# Patient Record
Sex: FEMALE | Race: WHITE | NOT HISPANIC OR LATINO | Employment: UNEMPLOYED | URBAN - METROPOLITAN AREA
[De-identification: names, ages, dates, MRNs, and addresses within clinical notes are randomized per-mention and may not be internally consistent; named-entity substitution may affect disease eponyms.]

---

## 2022-12-02 LAB
ABO + RH BLD: NORMAL
C TRACH RRNA SPEC QL PROBE: NORMAL
HBV SURFACE AG SERPL QL IA: NEGATIVE
HIV 1+2 AB+HIV1 P24 AG SERPL QL IA: NORMAL
N GONORRHOEAE, AMPLIFIED DNA: NORMAL
RPR: NORMAL
RUBELLA IMMUNE STATUS: NORMAL

## 2023-02-07 ENCOUNTER — HOSPITAL ENCOUNTER (EMERGENCY)
Facility: HOSPITAL | Age: 33
Discharge: HOME | End: 2023-02-07
Attending: OBSTETRICS & GYNECOLOGY
Payer: MEDICAID

## 2023-02-07 VITALS
HEIGHT: 64 IN | WEIGHT: 198 LBS | BODY MASS INDEX: 33.8 KG/M2 | OXYGEN SATURATION: 98 % | SYSTOLIC BLOOD PRESSURE: 109 MMHG | RESPIRATION RATE: 18 BRPM | HEART RATE: 93 BPM | DIASTOLIC BLOOD PRESSURE: 73 MMHG

## 2023-02-07 PROBLEM — O99.891 BACK PAIN AFFECTING PREGNANCY IN THIRD TRIMESTER: Status: ACTIVE | Noted: 2023-02-07

## 2023-02-07 PROBLEM — M54.9 BACK PAIN AFFECTING PREGNANCY IN THIRD TRIMESTER: Status: ACTIVE | Noted: 2023-02-07

## 2023-02-07 LAB
AMPHET UR QL SCN: NEGATIVE
APPEARANCE UR: ABNORMAL
BACTERIA #/AREA URNS AUTO: ABNORMAL /HPF
BARBITURATE SCN PRESENT UR: NEGATIVE
BENZODIAZ UR QL SCN: NEGATIVE
BILIRUB UR QL STRIP.AUTO: NEGATIVE MG/DL
CANNABINOIDS UR QL SCN: NEGATIVE
COCAINE UR QL SCN: NEGATIVE
COLOR UR AUTO: YELLOW
FENTANYL UR QL SCN: NEGATIVE
GLUCOSE UR QL STRIP.AUTO: NEGATIVE MG/DL
KETONES UR QL STRIP.AUTO: NEGATIVE MG/DL
LEUKOCYTE ESTERASE UR QL STRIP.AUTO: ABNORMAL UNIT/L
MDMA UR QL SCN: NEGATIVE
NITRITE UR QL STRIP.AUTO: NEGATIVE
OPIATES UR QL SCN: NEGATIVE
PCP UR QL: NEGATIVE
PH UR STRIP.AUTO: 8 [PH]
PH UR: 8 [PH] (ref 3–11)
PROT UR QL STRIP.AUTO: NEGATIVE MG/DL
RBC #/AREA URNS AUTO: <5 /HPF
RBC UR QL AUTO: ABNORMAL UNIT/L
SP GR UR STRIP.AUTO: 1.01 (ref 1–1.03)
SQUAMOUS #/AREA URNS AUTO: <5 /HPF
UROBILINOGEN UR STRIP-ACNC: 1 MG/DL
WBC #/AREA URNS AUTO: 43 /HPF

## 2023-02-07 PROCEDURE — 63700000 PHARM REV CODE 250 ALT 637 W/O HCPCS: Performed by: OBSTETRICS & GYNECOLOGY

## 2023-02-07 PROCEDURE — 81001 URINALYSIS AUTO W/SCOPE: CPT | Mod: 59 | Performed by: OBSTETRICS & GYNECOLOGY

## 2023-02-07 PROCEDURE — 25000003 PHARM REV CODE 250: Performed by: OBSTETRICS & GYNECOLOGY

## 2023-02-07 PROCEDURE — 63600175 PHARM REV CODE 636 W HCPCS: Performed by: OBSTETRICS & GYNECOLOGY

## 2023-02-07 PROCEDURE — 96360 HYDRATION IV INFUSION INIT: CPT

## 2023-02-07 PROCEDURE — 80307 DRUG TEST PRSMV CHEM ANLYZR: CPT | Performed by: OBSTETRICS & GYNECOLOGY

## 2023-02-07 PROCEDURE — 99284 EMERGENCY DEPT VISIT MOD MDM: CPT | Mod: 25

## 2023-02-07 PROCEDURE — 87088 URINE BACTERIA CULTURE: CPT | Performed by: OBSTETRICS & GYNECOLOGY

## 2023-02-07 RX ORDER — PROGESTERONE 100 MG/1
100 CAPSULE ORAL DAILY
Status: ON HOLD | COMMUNITY
End: 2023-03-05 | Stop reason: HOSPADM

## 2023-02-07 RX ORDER — ACETAMINOPHEN 325 MG/1
650 TABLET ORAL EVERY 6 HOURS PRN
Status: DISCONTINUED | OUTPATIENT
Start: 2023-02-07 | End: 2023-02-08 | Stop reason: HOSPADM

## 2023-02-07 RX ORDER — ACETAMINOPHEN 325 MG/1
650 TABLET ORAL
Status: COMPLETED | OUTPATIENT
Start: 2023-02-07 | End: 2023-02-07

## 2023-02-07 RX ADMIN — SODIUM CHLORIDE, POTASSIUM CHLORIDE, SODIUM LACTATE AND CALCIUM CHLORIDE 1000 ML: 600; 310; 30; 20 INJECTION, SOLUTION INTRAVENOUS at 09:02

## 2023-02-07 RX ADMIN — ACETAMINOPHEN 650 MG: 325 TABLET, FILM COATED ORAL at 10:02

## 2023-02-07 RX ADMIN — FLUCONAZOLE 150 MG: 50 TABLET ORAL at 10:02

## 2023-02-08 NOTE — ED PROVIDER NOTES
"       AUDI NOTE  [facility]      Admit Date: 2023  AUDI Physician: Kristyn Cristina  Primary OBGYN: Dr. Montalvo    Admit Diagnosis/Chief Complaint: Back Pain  Discharge Diagnosis:  yeast vaginiitis, dehydration    Chief Complaint   Patient presents with    Back Pain       Hospital Course:  Tori Bojorquez is a 32 y.o.  at 35w2d presents complaining of back pain.  This IUP is complicated by hx cerclage says was placed early 2nd tri by outside facility vaginally. Follows with Karan and Dr. Sanchez.  Denies fever, discharge and is having some dysuria. Denies hx flank pain.  Patient denies vaginal bleeding, leakage of fluid, contractions, headache, vision changes, RUQ pain, fever, and nausea/vomiting.  Fetal Movement: normal.    Ht 5' 4" (1.626 m)   Wt 89.8 kg (198 lb)   Breastfeeding No   BMI 33.99 kg/m²        General: in no apparent distress in no respiratory distress and acyanotic oriented times 3 afebrile normal vitals  Cardiovascular: regular rate and rhythm no murmurs  Respiratory: unlabored  Abdominal: soft, nontender, nondistended, no abnormal masses, no epigastric pain FHT present  Back: lumbar tenderness absent CVA tenderness none suprapubic tenderness absent  Extremeties no redness or tenderness in the calves or thighs no edema    SVE (PeriWATCH)  Dilation (cm): 0  Examined by:: Jonnie ALEXIS  Chaperone in room: [facility]  Vaginal Exam Comments: SSE closed with cerclage in place, thick long cervix, copious thick cottage cheese discharge noted, neg pooling or valsalva         EFM: Cat 1, 145 modBTV, +accel, no decel (reassuring, reactive)  TOCO: irritability      Medical Decision Making:    LABS:   No results found for this or any previous visit (from the past 24 hour(s)).    Imaging Results    None          ASSESMENT and clinical impression: Tori Bojorquez is a 32 y.o.   at 35w2d with yeast vaginitis, dehydration    Discharge Diagnosis/clinical impression: "   Patient Active Problem List   Diagnosis    Back pain affecting pregnancy in third trimester       Status:Improved    Disposition:  discharged to home    Medications:   Diflucan, IV fluids    Patient Instructions:   - Pt was given routine pregnancy instructions including to return to triage if she had any vaginal bleeding (other than spotting for the next 48hrs), any loss of fluid like her water broke, decreased fetal movement, or contractions Q 5min lasting for 2 or more hours. Pt was also instructed to drink copious water. Patient voiced understanding of all these instructions and was subsequently discharged home. Tylenol use and maternity belt use discussed. All questions answered. Pt left AUDI with good understanding of plan.   Preeclampsia/ROM/labor/fever/decreased FM with [facility] precautions discussed, voiced understanding     She will follow up with her primary OB as scheduled    Kristyn Cristina MD  OB/GYN Hospitalist  8:41 PM 02/07/2023

## 2023-02-09 LAB — BACTERIA UR CULT: NO GROWTH

## 2023-03-02 ENCOUNTER — ANESTHESIA EVENT (OUTPATIENT)
Dept: OBSTETRICS AND GYNECOLOGY | Facility: HOSPITAL | Age: 33
End: 2023-03-02
Payer: MEDICAID

## 2023-03-02 ENCOUNTER — HOSPITAL ENCOUNTER (INPATIENT)
Facility: HOSPITAL | Age: 33
LOS: 3 days | Discharge: HOME | End: 2023-03-05
Attending: OBSTETRICS & GYNECOLOGY | Admitting: OBSTETRICS & GYNECOLOGY
Payer: MEDICAID

## 2023-03-02 DIAGNOSIS — Z34.90 PREGNANCY: ICD-10-CM

## 2023-03-02 DIAGNOSIS — Z3A.38 38 WEEKS GESTATION OF PREGNANCY: Primary | ICD-10-CM

## 2023-03-02 DIAGNOSIS — O41.00X0 OLIGOHYDRAMNIOS: ICD-10-CM

## 2023-03-02 LAB
BASOPHILS # BLD AUTO: 0.02 X10(3)/MCL (ref 0–0.2)
BASOPHILS NFR BLD AUTO: 0.2 %
EOSINOPHIL # BLD AUTO: 0.11 X10(3)/MCL (ref 0–0.9)
EOSINOPHIL NFR BLD AUTO: 0.9 %
ERYTHROCYTE [DISTWIDTH] IN BLOOD BY AUTOMATED COUNT: 12.8 % (ref 11.5–17)
GROUP & RH: NORMAL
HCT VFR BLD AUTO: 39.3 % (ref 37–47)
HGB BLD-MCNC: 13 G/DL (ref 12–16)
IMM GRANULOCYTES # BLD AUTO: 0.17 X10(3)/MCL (ref 0–0.04)
IMM GRANULOCYTES NFR BLD AUTO: 1.4 %
INDIRECT COOMBS GEL: NORMAL
LYMPHOCYTES # BLD AUTO: 2.83 X10(3)/MCL (ref 0.6–4.6)
LYMPHOCYTES NFR BLD AUTO: 23.2 %
MCH RBC QN AUTO: 29.5 PG
MCHC RBC AUTO-ENTMCNC: 33.1 G/DL (ref 33–36)
MCV RBC AUTO: 89.3 FL (ref 80–94)
MONOCYTES # BLD AUTO: 0.73 X10(3)/MCL (ref 0.1–1.3)
MONOCYTES NFR BLD AUTO: 6 %
NEUTROPHILS # BLD AUTO: 8.34 X10(3)/MCL (ref 2.1–9.2)
NEUTROPHILS NFR BLD AUTO: 68.3 %
NRBC BLD AUTO-RTO: 0 %
PLATELET # BLD AUTO: 296 X10(3)/MCL (ref 130–400)
PMV BLD AUTO: 9.2 FL (ref 7.4–10.4)
PRENATAL STREP B CULTURE: NORMAL
RBC # BLD AUTO: 4.4 X10(6)/MCL (ref 4.2–5.4)
T PALLIDUM AB SER QL: NONREACTIVE
WBC # SPEC AUTO: 12.2 X10(3)/MCL (ref 4.5–11.5)

## 2023-03-02 PROCEDURE — 11000001 HC ACUTE MED/SURG PRIVATE ROOM

## 2023-03-02 PROCEDURE — 85025 COMPLETE CBC W/AUTO DIFF WBC: CPT | Performed by: OBSTETRICS & GYNECOLOGY

## 2023-03-02 PROCEDURE — 86900 BLOOD TYPING SEROLOGIC ABO: CPT | Performed by: OBSTETRICS & GYNECOLOGY

## 2023-03-02 PROCEDURE — 86780 TREPONEMA PALLIDUM: CPT | Performed by: OBSTETRICS & GYNECOLOGY

## 2023-03-02 RX ORDER — SODIUM CHLORIDE, SODIUM LACTATE, POTASSIUM CHLORIDE, CALCIUM CHLORIDE 600; 310; 30; 20 MG/100ML; MG/100ML; MG/100ML; MG/100ML
INJECTION, SOLUTION INTRAVENOUS CONTINUOUS
Status: DISCONTINUED | OUTPATIENT
Start: 2023-03-02 | End: 2023-03-05 | Stop reason: HOSPADM

## 2023-03-03 ENCOUNTER — ANESTHESIA (OUTPATIENT)
Dept: OBSTETRICS AND GYNECOLOGY | Facility: HOSPITAL | Age: 33
End: 2023-03-03
Payer: MEDICAID

## 2023-03-03 PROBLEM — Z3A.38 38 WEEKS GESTATION OF PREGNANCY: Status: ACTIVE | Noted: 2023-03-03

## 2023-03-03 PROCEDURE — 37000009 HC ANESTHESIA EA ADD 15 MINS: Performed by: OBSTETRICS & GYNECOLOGY

## 2023-03-03 PROCEDURE — 36004725 HC OB OR TIME LEV III - EA ADD 15 MIN: Performed by: OBSTETRICS & GYNECOLOGY

## 2023-03-03 PROCEDURE — 37000008 HC ANESTHESIA 1ST 15 MINUTES: Performed by: OBSTETRICS & GYNECOLOGY

## 2023-03-03 PROCEDURE — 63600175 PHARM REV CODE 636 W HCPCS

## 2023-03-03 PROCEDURE — 51702 INSERT TEMP BLADDER CATH: CPT

## 2023-03-03 PROCEDURE — 36004724 HC OB OR TIME LEV III - 1ST 15 MIN: Performed by: OBSTETRICS & GYNECOLOGY

## 2023-03-03 PROCEDURE — 11000001 HC ACUTE MED/SURG PRIVATE ROOM

## 2023-03-03 PROCEDURE — 88300 SURGICAL PATH GROSS: CPT

## 2023-03-03 PROCEDURE — 71000033 HC RECOVERY, INTIAL HOUR: Performed by: OBSTETRICS & GYNECOLOGY

## 2023-03-03 PROCEDURE — 63600175 PHARM REV CODE 636 W HCPCS: Performed by: OBSTETRICS & GYNECOLOGY

## 2023-03-03 PROCEDURE — 25000003 PHARM REV CODE 250

## 2023-03-03 PROCEDURE — 27000492 HC SLEEVE, SCD T/L

## 2023-03-03 PROCEDURE — 25000003 PHARM REV CODE 250: Performed by: ANESTHESIOLOGY

## 2023-03-03 PROCEDURE — C1765 ADHESION BARRIER: HCPCS | Performed by: OBSTETRICS & GYNECOLOGY

## 2023-03-03 DEVICE — BARRIER SEPRAFILM 4-SECTION: Type: IMPLANTABLE DEVICE | Site: ABDOMEN | Status: FUNCTIONAL

## 2023-03-03 RX ORDER — MISOPROSTOL 100 UG/1
800 TABLET ORAL ONCE AS NEEDED
Status: DISCONTINUED | OUTPATIENT
Start: 2023-03-03 | End: 2023-03-05 | Stop reason: HOSPADM

## 2023-03-03 RX ORDER — BUPIVACAINE HYDROCHLORIDE 7.5 MG/ML
INJECTION, SOLUTION EPIDURAL; RETROBULBAR
Status: COMPLETED | OUTPATIENT
Start: 2023-03-03 | End: 2023-03-03

## 2023-03-03 RX ORDER — OXYCODONE AND ACETAMINOPHEN 10; 325 MG/1; MG/1
1 TABLET ORAL EVERY 4 HOURS PRN
Status: DISCONTINUED | OUTPATIENT
Start: 2023-03-03 | End: 2023-03-05 | Stop reason: HOSPADM

## 2023-03-03 RX ORDER — DOCUSATE SODIUM 100 MG/1
200 CAPSULE, LIQUID FILLED ORAL 2 TIMES DAILY
Status: DISCONTINUED | OUTPATIENT
Start: 2023-03-03 | End: 2023-03-05 | Stop reason: HOSPADM

## 2023-03-03 RX ORDER — SODIUM CITRATE AND CITRIC ACID MONOHYDRATE 334; 500 MG/5ML; MG/5ML
SOLUTION ORAL
Status: DISPENSED
Start: 2023-03-03 | End: 2023-03-03

## 2023-03-03 RX ORDER — BISACODYL 10 MG
10 SUPPOSITORY, RECTAL RECTAL ONCE AS NEEDED
Status: DISCONTINUED | OUTPATIENT
Start: 2023-03-03 | End: 2023-03-05 | Stop reason: HOSPADM

## 2023-03-03 RX ORDER — MUPIROCIN 20 MG/G
OINTMENT TOPICAL 2 TIMES DAILY
Status: DISCONTINUED | OUTPATIENT
Start: 2023-03-03 | End: 2023-03-05 | Stop reason: HOSPADM

## 2023-03-03 RX ORDER — CARBOPROST TROMETHAMINE 250 UG/ML
250 INJECTION, SOLUTION INTRAMUSCULAR
Status: DISCONTINUED | OUTPATIENT
Start: 2023-03-03 | End: 2023-03-05 | Stop reason: HOSPADM

## 2023-03-03 RX ORDER — ONDANSETRON 2 MG/ML
4 INJECTION INTRAMUSCULAR; INTRAVENOUS EVERY 6 HOURS PRN
Status: ACTIVE | OUTPATIENT
Start: 2023-03-03 | End: 2023-03-04

## 2023-03-03 RX ORDER — KETOROLAC TROMETHAMINE 30 MG/ML
INJECTION, SOLUTION INTRAMUSCULAR; INTRAVENOUS
Status: DISCONTINUED | OUTPATIENT
Start: 2023-03-03 | End: 2023-03-03

## 2023-03-03 RX ORDER — PROCHLORPERAZINE EDISYLATE 5 MG/ML
5 INJECTION INTRAMUSCULAR; INTRAVENOUS EVERY 6 HOURS PRN
Status: DISCONTINUED | OUTPATIENT
Start: 2023-03-03 | End: 2023-03-03

## 2023-03-03 RX ORDER — PRENATAL WITH FERROUS FUM AND FOLIC ACID 3080; 920; 120; 400; 22; 1.84; 3; 20; 10; 1; 12; 200; 27; 25; 2 [IU]/1; [IU]/1; MG/1; [IU]/1; MG/1; MG/1; MG/1; MG/1; MG/1; MG/1; UG/1; MG/1; MG/1; MG/1; MG/1
1 TABLET ORAL DAILY
Status: DISCONTINUED | OUTPATIENT
Start: 2023-03-03 | End: 2023-03-05 | Stop reason: HOSPADM

## 2023-03-03 RX ORDER — MORPHINE SULFATE 4 MG/ML
4 INJECTION, SOLUTION INTRAMUSCULAR; INTRAVENOUS EVERY 4 HOURS PRN
Status: DISCONTINUED | OUTPATIENT
Start: 2023-03-03 | End: 2023-03-05 | Stop reason: HOSPADM

## 2023-03-03 RX ORDER — FENTANYL CITRATE 50 UG/ML
INJECTION, SOLUTION INTRAMUSCULAR; INTRAVENOUS
Status: DISCONTINUED | OUTPATIENT
Start: 2023-03-03 | End: 2023-03-03

## 2023-03-03 RX ORDER — SODIUM CHLORIDE 0.9 % (FLUSH) 0.9 %
10 SYRINGE (ML) INJECTION
Status: DISCONTINUED | OUTPATIENT
Start: 2023-03-03 | End: 2023-03-05 | Stop reason: HOSPADM

## 2023-03-03 RX ORDER — OXYTOCIN/RINGER'S LACTATE 30/500 ML
95 PLASTIC BAG, INJECTION (ML) INTRAVENOUS ONCE
Status: COMPLETED | OUTPATIENT
Start: 2023-03-03 | End: 2023-03-03

## 2023-03-03 RX ORDER — ACETAMINOPHEN 325 MG/1
650 TABLET ORAL EVERY 6 HOURS
Status: ACTIVE | OUTPATIENT
Start: 2023-03-03 | End: 2023-03-04

## 2023-03-03 RX ORDER — ONDANSETRON 4 MG/1
8 TABLET, ORALLY DISINTEGRATING ORAL EVERY 8 HOURS PRN
Status: DISCONTINUED | OUTPATIENT
Start: 2023-03-03 | End: 2023-03-05 | Stop reason: HOSPADM

## 2023-03-03 RX ORDER — DIPHENHYDRAMINE HCL 25 MG
25 CAPSULE ORAL EVERY 4 HOURS PRN
Status: DISCONTINUED | OUTPATIENT
Start: 2023-03-03 | End: 2023-03-05 | Stop reason: HOSPADM

## 2023-03-03 RX ORDER — OXYTOCIN 10 [USP'U]/ML
10 INJECTION, SOLUTION INTRAMUSCULAR; INTRAVENOUS ONCE AS NEEDED
Status: DISCONTINUED | OUTPATIENT
Start: 2023-03-03 | End: 2023-03-05 | Stop reason: HOSPADM

## 2023-03-03 RX ORDER — METHYLERGONOVINE MALEATE 0.2 MG/ML
INJECTION INTRAVENOUS
Status: DISCONTINUED | OUTPATIENT
Start: 2023-03-03 | End: 2023-03-03

## 2023-03-03 RX ORDER — METHYLERGONOVINE MALEATE 0.2 MG/ML
200 INJECTION INTRAVENOUS
Status: DISCONTINUED | OUTPATIENT
Start: 2023-03-03 | End: 2023-03-05 | Stop reason: HOSPADM

## 2023-03-03 RX ORDER — OXYTOCIN 10 [USP'U]/ML
INJECTION, SOLUTION INTRAMUSCULAR; INTRAVENOUS
Status: DISCONTINUED | OUTPATIENT
Start: 2023-03-03 | End: 2023-03-03

## 2023-03-03 RX ORDER — SIMETHICONE 80 MG
1 TABLET,CHEWABLE ORAL EVERY 6 HOURS PRN
Status: DISCONTINUED | OUTPATIENT
Start: 2023-03-03 | End: 2023-03-05 | Stop reason: HOSPADM

## 2023-03-03 RX ORDER — PROCHLORPERAZINE EDISYLATE 5 MG/ML
5 INJECTION INTRAMUSCULAR; INTRAVENOUS EVERY 6 HOURS PRN
Status: DISCONTINUED | OUTPATIENT
Start: 2023-03-03 | End: 2023-03-05 | Stop reason: HOSPADM

## 2023-03-03 RX ORDER — CEFAZOLIN SODIUM 2 G/50ML
2 SOLUTION INTRAVENOUS
Status: DISCONTINUED | OUTPATIENT
Start: 2023-03-03 | End: 2023-03-04

## 2023-03-03 RX ORDER — OXYCODONE HYDROCHLORIDE 5 MG/1
5 TABLET ORAL EVERY 4 HOURS PRN
Status: DISPENSED | OUTPATIENT
Start: 2023-03-03 | End: 2023-03-04

## 2023-03-03 RX ORDER — MORPHINE SULFATE 1 MG/ML
INJECTION, SOLUTION EPIDURAL; INTRATHECAL; INTRAVENOUS
Status: DISCONTINUED | OUTPATIENT
Start: 2023-03-03 | End: 2023-03-03

## 2023-03-03 RX ORDER — AMOXICILLIN 250 MG
1 CAPSULE ORAL NIGHTLY PRN
Status: DISCONTINUED | OUTPATIENT
Start: 2023-03-03 | End: 2023-03-05 | Stop reason: HOSPADM

## 2023-03-03 RX ORDER — OXYTOCIN/RINGER'S LACTATE 30/500 ML
334 PLASTIC BAG, INJECTION (ML) INTRAVENOUS ONCE AS NEEDED
Status: DISCONTINUED | OUTPATIENT
Start: 2023-03-03 | End: 2023-03-05 | Stop reason: HOSPADM

## 2023-03-03 RX ORDER — MORPHINE SULFATE 4 MG/ML
2 INJECTION, SOLUTION INTRAMUSCULAR; INTRAVENOUS EVERY 5 MIN PRN
Status: DISCONTINUED | OUTPATIENT
Start: 2023-03-04 | End: 2023-03-05 | Stop reason: HOSPADM

## 2023-03-03 RX ORDER — OXYCODONE HYDROCHLORIDE 5 MG/1
10 TABLET ORAL EVERY 4 HOURS PRN
Status: DISCONTINUED | OUTPATIENT
Start: 2023-03-03 | End: 2023-03-03

## 2023-03-03 RX ORDER — ONDANSETRON 2 MG/ML
INJECTION INTRAMUSCULAR; INTRAVENOUS
Status: DISCONTINUED | OUTPATIENT
Start: 2023-03-03 | End: 2023-03-03

## 2023-03-03 RX ORDER — SODIUM CITRATE AND CITRIC ACID MONOHYDRATE 334; 500 MG/5ML; MG/5ML
30 SOLUTION ORAL ONCE
Status: DISCONTINUED | OUTPATIENT
Start: 2023-03-03 | End: 2023-03-05 | Stop reason: HOSPADM

## 2023-03-03 RX ORDER — EPHEDRINE SULFATE 50 MG/ML
INJECTION, SOLUTION INTRAVENOUS
Status: DISCONTINUED | OUTPATIENT
Start: 2023-03-03 | End: 2023-03-03

## 2023-03-03 RX ORDER — ACETAMINOPHEN 10 MG/ML
INJECTION, SOLUTION INTRAVENOUS
Status: DISCONTINUED | OUTPATIENT
Start: 2023-03-03 | End: 2023-03-03

## 2023-03-03 RX ORDER — OXYTOCIN/RINGER'S LACTATE 30/500 ML
95 PLASTIC BAG, INJECTION (ML) INTRAVENOUS ONCE AS NEEDED
Status: DISCONTINUED | OUTPATIENT
Start: 2023-03-03 | End: 2023-03-05 | Stop reason: HOSPADM

## 2023-03-03 RX ORDER — ADHESIVE BANDAGE
30 BANDAGE TOPICAL 2 TIMES DAILY PRN
Status: DISCONTINUED | OUTPATIENT
Start: 2023-03-04 | End: 2023-03-05 | Stop reason: HOSPADM

## 2023-03-03 RX ADMIN — METHYLERGONOVINE MALEATE 200 MCG: 0.2 INJECTION, SOLUTION INTRAMUSCULAR; INTRAVENOUS at 09:03

## 2023-03-03 RX ADMIN — MORPHINE SULFATE 4 MG: 4 INJECTION INTRAVENOUS at 03:03

## 2023-03-03 RX ADMIN — CEFAZOLIN SODIUM 2 G: 2 SOLUTION INTRAVENOUS at 03:03

## 2023-03-03 RX ADMIN — BUPIVACAINE HYDROCHLORIDE 1.5 ML: 7.5 INJECTION, SOLUTION EPIDURAL; RETROBULBAR at 08:03

## 2023-03-03 RX ADMIN — PHENYLEPHRINE HYDROCHLORIDE 0.02 MCG/KG/MIN: 10 INJECTION INTRAVENOUS at 08:03

## 2023-03-03 RX ADMIN — SODIUM CHLORIDE, POTASSIUM CHLORIDE, SODIUM LACTATE AND CALCIUM CHLORIDE: 600; 310; 30; 20 INJECTION, SOLUTION INTRAVENOUS at 08:03

## 2023-03-03 RX ADMIN — OXYTOCIN 30 UNITS: 10 INJECTION, SOLUTION INTRAMUSCULAR; INTRAVENOUS at 09:03

## 2023-03-03 RX ADMIN — FENTANYL CITRATE 10 MCG: 50 INJECTION, SOLUTION INTRAMUSCULAR; INTRAVENOUS at 08:03

## 2023-03-03 RX ADMIN — ONDANSETRON 4 MG: 2 INJECTION INTRAMUSCULAR; INTRAVENOUS at 08:03

## 2023-03-03 RX ADMIN — Medication 95 MILLI-UNITS/MIN: at 10:03

## 2023-03-03 RX ADMIN — EPHEDRINE SULFATE 50 MG: 50 INJECTION INTRAVENOUS at 09:03

## 2023-03-03 RX ADMIN — MORPHINE SULFATE 0.1 MG: 1 INJECTION, SOLUTION EPIDURAL; INTRATHECAL; INTRAVENOUS at 08:03

## 2023-03-03 RX ADMIN — MORPHINE SULFATE 4 MG: 4 INJECTION INTRAVENOUS at 11:03

## 2023-03-03 RX ADMIN — KETOROLAC TROMETHAMINE 30 MG: 30 INJECTION, SOLUTION INTRAMUSCULAR; INTRAVENOUS at 09:03

## 2023-03-03 RX ADMIN — ACETAMINOPHEN 1000 MG: 10 INJECTION, SOLUTION INTRAVENOUS at 09:03

## 2023-03-03 NOTE — ANESTHESIA PREPROCEDURE EVALUATION
2023  Tori Bojorquez is a 32 y.o., female.      Tori Bojorquez    Pre-op Diagnosis: Oligohydramnios [O41.00X0]    Procedure(s):  REMOVAL, CERCLAGE SUTURE   SECTION     Review of patient's allergies indicates:   Allergen Reactions    Bactrim [sulfamethoxazole-trimethoprim]        Current Outpatient Medications   Medication Instructions    progesterone (PROMETRIUM) 100 mg, Oral, Daily     PMH negative    Past Surgical History:   Procedure Laterality Date     SECTION      CHOLECYSTECTOMY      TONSILLECTOMY       Lab Results   Component Value Date    WBC 12.2 (H) 2023    HGB 13.0 2023    HCT 39.3 2023    MCV 89.3 2023     2023          Pre-op Assessment    I have reviewed the Patient Summary Reports.     I have reviewed the Nursing Notes. I have reviewed the NPO Status.   I have reviewed the Medications.     Review of Systems  Anesthesia Hx:  No problems with previous Anesthesia  Denies Family Hx of Anesthesia complications.   Denies Personal Hx of Anesthesia complications.   Social:  Former Smoker    EENT/Dental:   Ears General/Symptom(s) Ear Symptoms:  Denies Throat Symptoms    Cardiovascular:   Exercise tolerance: good  Denies Angina.  Denies Orthopnea.  Denies PND.  Denies AGUIAR.  Functional Capacity good / => 4 METS    Musculoskeletal:  Musculoskeletal Normal    OB/GYN/PEDS:  Legal Guardian is Parents , birth was Full Term Denies Problems Associated with Premature Birth Denies Developmental Delay Denies Anomilies    Neurological:   Denies TIA. Denies CVA.    Psych:  Psychiatric Normal           Physical Exam  General: Well nourished, Alert and Oriented    Airway:  Mallampati: III   Mouth Opening: Normal  TM Distance: Normal  Tongue: Normal  Neck ROM: Normal ROM    Dental:  Intact    Chest/Lungs:  Clear to auscultation    Heart:  Rate:  Normal  Rhythm: Regular Rhythm  No pretibial edema  No carotid bruits      Anesthesia Plan  Type of Anesthesia, risks & benefits discussed:    Anesthesia Type: Spinal  Intra-op Monitoring Plan: Standard ASA Monitors  Post Op Pain Control Plan: multimodal analgesia  Induction:  IV  Airway Plan: Direct  Informed Consent: Informed consent signed with the Patient and all parties understand the risks and agree with anesthesia plan.  All questions answered. Patient consented to blood products? Yes  ASA Score: 2  Day of Surgery Review of History & Physical: H&P Update referred to the surgeon/provider.  Anesthesia Plan Notes: Risks post spinal headache, backache, high spinal, sensory / motor neuropathy, & failed spinal with possible General Anesthesia (GETA) explained & patient accepts     Ready For Surgery From Anesthesia Perspective.     .

## 2023-03-03 NOTE — PROCEDURE NOTE ADDENDUM
BIOPHYSICAL PROFILE AND TRANSVAGINAL OBSTETRICAL ULTRASOUND  REPORT    DATE OF ULTRASOUND: 2023     INDICATION: abdominal pain, cervical incompetence     Gestational Age: 38w4d     Movement: 2/2  Tone:  2/2  Breathin/2  Fluid:  2/2 PLACIDO 3.0, with 2 cm pocket.     FHR:  135,152,153 bpm during ultrasound    PLACIDO: 3.0    Presentation: Cephalic    Transvaginal ultrasound done could not retrieve adequate images to assess cervical length, because of patient discomfort and requesting to remove vaginal proble      Impression:  1.Biophysical profile  with oligohydramnios.     2. Uninterpretable images on transvaginal ultrasound.     With PLACIDO of 3 at 38.5 weeks, discussed findings with Dr Ervin and recommended delivery. Cerclage could be removed under anesthesia, after cs.         Sanket Russo MD       Components of this note were documented using voice recognition systems; and are subject to errors not corrected at proof reading. Please contact author for any clarifications.

## 2023-03-03 NOTE — ANESTHESIA PROCEDURE NOTES
Spinal    Diagnosis: Previous C Section  Patient location during procedure: OR  Start time: 3/3/2023 8:50 AM  Timeout: 3/3/2023 8:50 AM  End time: 3/3/2023 8:55 AM    Staffing  Authorizing Provider: Saman Velasquez MD  Performing Provider: Saman Velasquez MD    Preanesthetic Checklist  Completed: patient identified, IV checked, site marked, risks and benefits discussed, surgical consent, monitors and equipment checked, pre-op evaluation and timeout performed  Spinal Block  Patient position: sitting  Prep: ChloraPrep  Patient monitoring: heart rate, cardiac monitor, continuous pulse ox and frequent blood pressure checks  Approach: midline  Location: L4-5  Injection technique: single shot  CSF Fluid: clear free-flowing CSF  Needle  Needle type: Oscar   Needle gauge: 25 G  Needle length: 3.5 in  Additional Documentation: incremental injection, negative aspiration for heme and no paresthesia on injection  Needle localization: anatomical landmarks  Assessment  Sensory level: T5   Dermatomal levels determined by alcohol wipe  Ease of block: moderate  Additional Notes  Sitting position, usual prep & drape, local to skin & subq 25 ga 1% xylocaine (3 ml)  Introducer in, 25 ga Pencan needle, some discomfort when advancing through ligaments, clear csf on 3rd pass, transient paresthesia R leg < 2 seconds, dosed, 1.5 ml 0.75% Bupivacaine (in 8.25% dextrose), 10 mcg Fentanyl & 100 mcg Duramorph intrathecal, needle out, to supine with CALOS   Medications:    Medications: bupivacaine (pf) (MARCAINE) injection 0.75% - Intraspinal   1.5 mL - 3/3/2023 8:55:00 AM

## 2023-03-03 NOTE — H&P
[facility]                       [address] [id number]    PATIENT NAME:       RAF BOJORQUEZ   YOB: 1990  CSN:                [id number]   MRN:                [id number]  ADMIT DATE:         2023 17:00:00  PHYSICIAN:          Nader Sanchez MD                        HISTORY AND PHYSICAL      Ms. Bojorquez is a 32-year-old female,  3, para 2.  Last menstrual period is   uncertain.  EDC is 2023, estimated gestational age is 38 weeks.  She   presents for repeat  at 38 weeks because of oligohydramnios at the   direction of Maternal Fetal Medicine physician, Dr. Russo.  She also presents   for cerclage removal post .    PAST MEDICAL HISTORY:  Unremarkable.    PAST SURGICAL HISTORY:  She has had  section x2, also cerclage   placement.    ALLERGIES:  SHE IS ALLERGIC TO BACTRIM.     SOCIAL HISTORY:  Negative.    PHYSICAL EXAM:  VITAL SIGNS:  She is normotensive and afebrile.  HEAD, EARS, EYES, NOSE, THROAT:  Within normal limits.    CHEST:  Clear A to P.  HEART:  Normal sinus rhythm.  ABDOMEN:  Gravid.    EXTREMITIES:  Within normal limits.  NEUROLOGICAL:  Cranial nerves 2 through 12 are grossly intact.    IMPRESSION:    1. Thirty-eight week intrauterine pregnancy.  2.  section x2.  3. Oligohydramnios.    PLAN:    1.  section.  2. Cerclage removal.        ______________________________  Nader Sanchez MD    DCR/AQS  DD:  2023  Time:  08:15AM  DT:  2023  Time:  09:05AM  Job #:  [id number]    cc:   __________, [phone]        __________, [phone]        HISTORY AND PHYSICAL

## 2023-03-03 NOTE — TRANSFER OF CARE
Anesthesia Transfer of Care Note    Patient: Tori Bojorquez    Procedure(s) Performed: Procedure(s) (LRB):  REMOVAL, CERCLAGE SUTURE (N/A)   SECTION (N/A)    Patient location: PACU    Anesthesia Type: spinal    Transport from OR: Transported from OR on room air with adequate spontaneous ventilation    Post pain: adequate analgesia    Post assessment: no apparent anesthetic complications    Post vital signs: stable    Level of consciousness: awake, oriented and alert    Nausea/Vomiting: no nausea/vomiting    Complications: none    Transfer of care protocol was followed      Last vitals:   Visit Vitals  /64 (BP Location: Left arm)   Pulse 86   Temp 36.9 °C (98.5 °F) (Oral)   Resp 17

## 2023-03-04 LAB
BASOPHILS # BLD AUTO: 0.04 X10(3)/MCL (ref 0–0.2)
BASOPHILS NFR BLD AUTO: 0.3 %
EOSINOPHIL # BLD AUTO: 0.08 X10(3)/MCL (ref 0–0.9)
EOSINOPHIL NFR BLD AUTO: 0.6 %
ERYTHROCYTE [DISTWIDTH] IN BLOOD BY AUTOMATED COUNT: 13.1 % (ref 11.5–17)
HCT VFR BLD AUTO: 30.9 % (ref 37–47)
HGB BLD-MCNC: 10.1 G/DL (ref 12–16)
IMM GRANULOCYTES # BLD AUTO: 0.12 X10(3)/MCL (ref 0–0.04)
IMM GRANULOCYTES NFR BLD AUTO: 0.8 %
LYMPHOCYTES # BLD AUTO: 1.81 X10(3)/MCL (ref 0.6–4.6)
LYMPHOCYTES NFR BLD AUTO: 12.7 %
MCH RBC QN AUTO: 29.4 PG
MCHC RBC AUTO-ENTMCNC: 32.7 G/DL (ref 33–36)
MCV RBC AUTO: 89.8 FL (ref 80–94)
MONOCYTES # BLD AUTO: 0.6 X10(3)/MCL (ref 0.1–1.3)
MONOCYTES NFR BLD AUTO: 4.2 %
NEUTROPHILS # BLD AUTO: 11.61 X10(3)/MCL (ref 2.1–9.2)
NEUTROPHILS NFR BLD AUTO: 81.4 %
NRBC BLD AUTO-RTO: 0 %
PLATELET # BLD AUTO: 263 X10(3)/MCL (ref 130–400)
PMV BLD AUTO: 9.3 FL (ref 7.4–10.4)
RBC # BLD AUTO: 3.44 X10(6)/MCL (ref 4.2–5.4)
WBC # SPEC AUTO: 14.3 X10(3)/MCL (ref 4.5–11.5)

## 2023-03-04 PROCEDURE — 63600175 PHARM REV CODE 636 W HCPCS: Performed by: OBSTETRICS & GYNECOLOGY

## 2023-03-04 PROCEDURE — 11000001 HC ACUTE MED/SURG PRIVATE ROOM

## 2023-03-04 PROCEDURE — 25000003 PHARM REV CODE 250: Performed by: ANESTHESIOLOGY

## 2023-03-04 PROCEDURE — 85025 COMPLETE CBC W/AUTO DIFF WBC: CPT | Performed by: OBSTETRICS & GYNECOLOGY

## 2023-03-04 PROCEDURE — 51702 INSERT TEMP BLADDER CATH: CPT

## 2023-03-04 PROCEDURE — 25000003 PHARM REV CODE 250: Performed by: OBSTETRICS & GYNECOLOGY

## 2023-03-04 RX ORDER — IBUPROFEN 600 MG/1
600 TABLET ORAL EVERY 6 HOURS PRN
Status: DISCONTINUED | OUTPATIENT
Start: 2023-03-04 | End: 2023-03-05 | Stop reason: HOSPADM

## 2023-03-04 RX ADMIN — SIMETHICONE 80 MG: 80 TABLET, CHEWABLE ORAL at 03:03

## 2023-03-04 RX ADMIN — DOCUSATE SODIUM 200 MG: 100 CAPSULE, LIQUID FILLED ORAL at 10:03

## 2023-03-04 RX ADMIN — SODIUM CHLORIDE, POTASSIUM CHLORIDE, SODIUM LACTATE AND CALCIUM CHLORIDE: 600; 310; 30; 20 INJECTION, SOLUTION INTRAVENOUS at 04:03

## 2023-03-04 RX ADMIN — IBUPROFEN 600 MG: 600 TABLET, FILM COATED ORAL at 03:03

## 2023-03-04 RX ADMIN — OXYCODONE HYDROCHLORIDE AND ACETAMINOPHEN 1 TABLET: 10; 325 TABLET ORAL at 11:03

## 2023-03-04 RX ADMIN — DOCUSATE SODIUM 200 MG: 100 CAPSULE, LIQUID FILLED ORAL at 07:03

## 2023-03-04 RX ADMIN — CEFAZOLIN SODIUM 2 G: 2 SOLUTION INTRAVENOUS at 03:03

## 2023-03-04 RX ADMIN — PRENATAL VITAMINS-IRON FUMARATE 27 MG IRON-FOLIC ACID 0.8 MG TABLET 1 TABLET: at 10:03

## 2023-03-04 RX ADMIN — IBUPROFEN 600 MG: 600 TABLET, FILM COATED ORAL at 10:03

## 2023-03-04 RX ADMIN — OXYCODONE HYDROCHLORIDE 5 MG: 5 TABLET ORAL at 04:03

## 2023-03-04 RX ADMIN — OXYCODONE HYDROCHLORIDE AND ACETAMINOPHEN 1 TABLET: 10; 325 TABLET ORAL at 07:03

## 2023-03-04 RX ADMIN — OXYCODONE HYDROCHLORIDE AND ACETAMINOPHEN 1 TABLET: 10; 325 TABLET ORAL at 03:03

## 2023-03-04 RX ADMIN — SIMETHICONE 80 MG: 80 TABLET, CHEWABLE ORAL at 10:03

## 2023-03-04 NOTE — PLAN OF CARE
"  Problem: Adult Inpatient Plan of Care  Goal: Plan of Care Review  Outcome: Ongoing, Progressing  Goal: Patient-Specific Goal (Individualized)  Description: "I want to bottle feed my baby"  Outcome: Ongoing, Progressing  Goal: Absence of Hospital-Acquired Illness or Injury  Outcome: Ongoing, Progressing  Goal: Optimal Comfort and Wellbeing  Outcome: Ongoing, Progressing  Goal: Readiness for Transition of Care  Outcome: Ongoing, Progressing     Problem:  Fall Injury Risk  Goal: Absence of Fall, Infant Drop and Related Injury  Outcome: Ongoing, Progressing     Problem: Infection  Goal: Absence of Infection Signs and Symptoms  Outcome: Ongoing, Progressing     Problem: Bleeding ( Delivery)  Goal: Bleeding is Controlled  Outcome: Ongoing, Progressing     Problem: Change in Fetal Wellbeing ( Delivery)  Goal: Stable Fetal Wellbeing  Outcome: Ongoing, Progressing     Problem: Infection ( Delivery)  Goal: Absence of Infection Signs and Symptoms  Outcome: Ongoing, Progressing     Problem: Respiratory Compromise ( Delivery)  Goal: Effective Oxygenation and Ventilation  Outcome: Ongoing, Progressing     "

## 2023-03-04 NOTE — PROGRESS NOTES
[facility]                       [address] [id number]    PATIENT NAME:       RAF BOJORQUEZ   YOB: 1990  CSN:                [id number]   MRN:                [id number]  ADMIT DATE:         2023 17:00:00  PHYSICIAN:          Nader Sanchez MD                            PROGRESS NOTE    DATE:  2023 00:00:00    Ms. Bojorquez is in postop day #1 status post  section.  She is doing well.    She is normotensive, she is afebrile.  The uterus is firm, there is scant   lochia.  Her incision site is within normal limits.  She has had appropriate   urine output.  She has received her 2-dose postop IV antibiotics.    IMPRESSION:  Postoperative day #1.    PLAN:  To have the patient ambulate and begin to advance diet as tolerated.        ______________________________  Nader Sanchez MD    DCR/AQS  DD:  2023  Time:  05:39AM  DT:  2023  Time:  05:54AM  Job #:  [id number]      PROGRESS NOTE

## 2023-03-05 VITALS
RESPIRATION RATE: 16 BRPM | OXYGEN SATURATION: 98 % | DIASTOLIC BLOOD PRESSURE: 73 MMHG | HEART RATE: 84 BPM | TEMPERATURE: 98 F | SYSTOLIC BLOOD PRESSURE: 111 MMHG

## 2023-03-05 PROCEDURE — 25000003 PHARM REV CODE 250: Performed by: OBSTETRICS & GYNECOLOGY

## 2023-03-05 RX ADMIN — OXYCODONE HYDROCHLORIDE AND ACETAMINOPHEN 1 TABLET: 10; 325 TABLET ORAL at 05:03

## 2023-03-05 RX ADMIN — IBUPROFEN 600 MG: 600 TABLET, FILM COATED ORAL at 05:03

## 2023-03-05 RX ADMIN — OXYCODONE HYDROCHLORIDE AND ACETAMINOPHEN 1 TABLET: 10; 325 TABLET ORAL at 10:03

## 2023-03-05 RX ADMIN — OXYCODONE HYDROCHLORIDE AND ACETAMINOPHEN 1 TABLET: 10; 325 TABLET ORAL at 02:03

## 2023-03-05 RX ADMIN — DOCUSATE SODIUM 200 MG: 100 CAPSULE, LIQUID FILLED ORAL at 10:03

## 2023-03-05 NOTE — DISCHARGE SUMMARY
[facility]                       [address] [id number]    PATIENT NAME:       RAF BOJORQUEZ   YOB: 1990  CSN:                [id number]   MRN:                [id number]  ADMIT DATE:         2023 17:00:00  PHYSICIAN:          Nader Sanchez MD                          DISCHARGE SUMMARY    DATE OF DISCHARGE:  2023 00:00:00    Ms. Bojorquez is a 32-year-old female who had previous  section.  She   presented at 38 and 4/7 weeks of gestation for nonstress test.  An incidental   biophysical profile was performed.  I was informed that the patient had   oligohydramnios.  Therefore, repeat  section was done.  She did well   intraop as well as postop.  Blood type is B negative.  The infant is Rh negative   also.  RhoGAM was ordered per protocol.  She will be discharged to home on   analgesics and antibiotics and present to the office within 24 hours for staple   removal.        ______________________________  Nader Sanchez MD    DCR/AQS  DD:  2023  Time:  01:01PM  DT:  2023  Time:  01:14PM  Job #:  [id number]    cc:   [phone]        Fax [phone]        DISCHARGE SUMMARY

## 2023-03-06 NOTE — ANESTHESIA POSTPROCEDURE EVALUATION
Anesthesia Post Evaluation    Patient: Tori Bojorquez    Procedure(s) Performed: Procedure(s) (LRB):  REMOVAL, CERCLAGE SUTURE (N/A)   SECTION (N/A)    Final Anesthesia Type: spinal      Patient location during evaluation: PACU  Patient participation: Yes- Able to Participate  Level of consciousness: awake and alert  Post-procedure vital signs: reviewed and stable  Pain management: adequate  Airway patency: patent  BRUNO mitigation strategies: Use of major conduction anesthesia (spinal/epidural) or peripheral nerve block  PONV status at discharge: No PONV  Anesthetic complications: no      Cardiovascular status: hemodynamically stable  Respiratory status: unassisted, spontaneous ventilation and room air  Hydration status: euvolemic  Follow-up not needed.  Comments: Denies problem c spinal or epidural anesthesia           Vitals Value Taken Time   /73 23 0747   Temp 36.7 °C (98.1 °F) 23 0747   Pulse 84 23 0747   Resp 16 23 1403   SpO2 98 % 23 1059         Event Time   Out of Recovery 2023 10:59:00         Pain/Lisa Score: Pain Rating Prior to Med Admin: 8 (3/5/2023  2:03 PM)  Pain Rating Post Med Admin: 3 (3/5/2023  5:05 AM)

## 2023-03-08 NOTE — OP NOTE
[facility]                       [address] [id number]    PATIENT NAME:      RAF CANDELARIA   YOB: 1990  CSN:               [id number]  MRN:               [id number]  ADMIT DATE:        2023 17:00:00  PHYSICIAN:         Nader Sanchez MD                          OPERATIVE REPORT      DATE OF SURGERY:    2023 00:00:00    SURGEON:  Nader Sanchez MD    PREOPERATIVE DIAGNOSES:    1. 38-week intrauterine pregnancy.   2. Previous  section x2.    3. Oligohydramnios.    POSTOPERATIVE DIAGNOSES:    1. 38-week intrauterine pregnancy.   2. Previous  section x2.    3. Oligohydramnios.    OPERATIVE PROCEDURES:    1. Repeat low-transverse  section.   2. Cerclage removal.    ANESTHESIA:  Spinal.    TECHNIQUE:  After all risks, benefits and alternative therapies were offered to   the patient and the family and the informed consents were signed, the patient   was brought to the operative suite at [facility] and   placed in the sitting position and given a spinal anesthetic agent.  Next, she   was placed in a supine position, prepped and draped in usual sterile fashion.    With the 1st scalpel, a transverse Pfannenstiel skin incision was performed.    With the 2nd scalpel, the depth of the incision was taken down to the fascial   layers.  The fascia was nicked transversely and gently dissected off the rectus   muscle cephalic and caudad.  The rectus muscles were split at their midline,   followed by elevation of the peritoneum with pickups with teeth x2.  The   vertical peritoneal incision was performed.  The lower uterine segment   transverse serosal incision was performed.  The lower uterine segment myometrial   incision was performed.  There was delivery of the body of the infant.  The   cord was clamped x2 and excised.  The placenta was removed and  given to the LOFT   team.  There was removal of all membranous material from the uterine cavity.    There was closure of the lower uterine segment myometrial incision with 0 Vicryl   suture material.  Imbrication of the initial closure line was done with the   same suture material.  The pericolic gutters were checked and cleaned.  There   was noted to be normal tubes and ovaries bilaterally.  There was copious   irrigation of the abdominal peritoneum until clear.  Adequate hemostasis was   accomplished.  Seprafilm was placed over the lower uterine segment.  There was   closure of the anterior peritoneum with 2-0 Vicryl suture material after correct   count was noted x2.  The rectus muscles were reapposed with figure-of-eight   sutures of 2-0 Vicryl suture material.  The fascia was closed with #1 Vicryl   suture material.  Subcutaneous fat was closed with 2-0 Vicryl suture material.    Skin was closed with skin clips.  The patient was repositioned and placed in   dorsal lithotomy position.  Cerclage suture was removed in the usual fashion   without difficulty.  The patient was discharged to postop for normal postop   care.  The cerclage suture was sent to Pathology for picture graphic   representation.        ______________________________  Nader Sanchez MD    DCR/AQS  DD:  03/07/2023  Time:  09:13PM  DT:  03/07/2023  Time:  09:28PM  Job #:  [id number]    cc:   __________, [phone]        __________, [phone]        OPERATIVE REPORT

## 2023-03-14 LAB — PSYCHE PATHOLOGY RESULT: NORMAL

## 2023-12-11 DIAGNOSIS — O20.8 SUBCHORIONIC HEMORRHAGE IN FIRST TRIMESTER: Primary | ICD-10-CM

## 2023-12-11 LAB
C TRACH DNA SPEC QL NAA+PROBE: NORMAL
HBV SURFACE AG SERPL QL IA: NEGATIVE
HIV 1+2 AB+HIV1 P24 AG SERPL QL IA: NEGATIVE
N GONORRHOEA DNA SPEC QL NAA+PROBE: NEGATIVE
RUBV IGG SER QL: NORMAL
T PALLIDUM AB SER QL: NONREACTIVE

## 2023-12-14 ENCOUNTER — OFFICE VISIT (OUTPATIENT)
Dept: MATERNAL FETAL MEDICINE | Facility: CLINIC | Age: 33
End: 2023-12-14
Payer: MEDICAID

## 2023-12-14 ENCOUNTER — PROCEDURE VISIT (OUTPATIENT)
Dept: MATERNAL FETAL MEDICINE | Facility: CLINIC | Age: 33
End: 2023-12-14
Payer: MEDICAID

## 2023-12-14 VITALS
DIASTOLIC BLOOD PRESSURE: 67 MMHG | WEIGHT: 207 LBS | BODY MASS INDEX: 35.34 KG/M2 | HEIGHT: 64 IN | SYSTOLIC BLOOD PRESSURE: 101 MMHG | HEART RATE: 80 BPM

## 2023-12-14 DIAGNOSIS — Z98.890 HISTORY OF CERCLAGE, CURRENTLY PREGNANT, FIRST TRIMESTER: ICD-10-CM

## 2023-12-14 DIAGNOSIS — O20.9 VAGINAL BLEEDING AFFECTING EARLY PREGNANCY: ICD-10-CM

## 2023-12-14 DIAGNOSIS — O26.891 RH NEGATIVE STATE IN ANTEPARTUM PERIOD, FIRST TRIMESTER: ICD-10-CM

## 2023-12-14 DIAGNOSIS — O09.291 HISTORY OF CERCLAGE, CURRENTLY PREGNANT, FIRST TRIMESTER: ICD-10-CM

## 2023-12-14 DIAGNOSIS — O09.90 AT HIGH RISK FOR COMPLICATIONS OF INTRAUTERINE PREGNANCY (IUP): ICD-10-CM

## 2023-12-14 DIAGNOSIS — Z67.91 RH NEGATIVE STATE IN ANTEPARTUM PERIOD, FIRST TRIMESTER: ICD-10-CM

## 2023-12-14 DIAGNOSIS — O20.8 SUBCHORIONIC HEMORRHAGE IN FIRST TRIMESTER: ICD-10-CM

## 2023-12-14 DIAGNOSIS — O34.31 CERVICAL INCOMPETENCE DURING PREGNANCY IN FIRST TRIMESTER: ICD-10-CM

## 2023-12-14 DIAGNOSIS — O46.8X1 SUBCHORIONIC HEMORRHAGE OF PLACENTA IN FIRST TRIMESTER, SINGLE OR UNSPECIFIED FETUS: Primary | ICD-10-CM

## 2023-12-14 DIAGNOSIS — O34.219 PREVIOUS CESAREAN SECTION COMPLICATING PREGNANCY: ICD-10-CM

## 2023-12-14 DIAGNOSIS — O41.8X10 SUBCHORIONIC HEMORRHAGE OF PLACENTA IN FIRST TRIMESTER, SINGLE OR UNSPECIFIED FETUS: Primary | ICD-10-CM

## 2023-12-14 PROBLEM — O41.8X90 SUBCHORIONIC HEMORRHAGE: Status: ACTIVE | Noted: 2023-12-14

## 2023-12-14 PROBLEM — M54.9 BACK PAIN AFFECTING PREGNANCY IN THIRD TRIMESTER: Status: RESOLVED | Noted: 2023-02-07 | Resolved: 2023-12-14

## 2023-12-14 PROBLEM — Z3A.38 38 WEEKS GESTATION OF PREGNANCY: Status: RESOLVED | Noted: 2023-03-03 | Resolved: 2023-12-14

## 2023-12-14 PROBLEM — O99.891 BACK PAIN AFFECTING PREGNANCY IN THIRD TRIMESTER: Status: RESOLVED | Noted: 2023-02-07 | Resolved: 2023-12-14

## 2023-12-14 PROBLEM — O46.8X9 SUBCHORIONIC HEMORRHAGE: Status: ACTIVE | Noted: 2023-12-14

## 2023-12-14 PROCEDURE — 99204 OFFICE O/P NEW MOD 45 MIN: CPT | Mod: TH,S$GLB,, | Performed by: OBSTETRICS & GYNECOLOGY

## 2023-12-14 PROCEDURE — 3008F BODY MASS INDEX DOCD: CPT | Mod: CPTII,S$GLB,, | Performed by: OBSTETRICS & GYNECOLOGY

## 2023-12-14 PROCEDURE — 76801 OB US < 14 WKS SINGLE FETUS: CPT | Mod: S$GLB,,, | Performed by: OBSTETRICS & GYNECOLOGY

## 2023-12-14 PROCEDURE — 1159F PR MEDICATION LIST DOCUMENTED IN MEDICAL RECORD: ICD-10-PCS | Mod: CPTII,S$GLB,, | Performed by: OBSTETRICS & GYNECOLOGY

## 2023-12-14 PROCEDURE — 3074F SYST BP LT 130 MM HG: CPT | Mod: CPTII,S$GLB,, | Performed by: OBSTETRICS & GYNECOLOGY

## 2023-12-14 PROCEDURE — 99204 PR OFFICE/OUTPT VISIT, NEW, LEVL IV, 45-59 MIN: ICD-10-PCS | Mod: TH,S$GLB,, | Performed by: OBSTETRICS & GYNECOLOGY

## 2023-12-14 PROCEDURE — 76801 PR US, OB <14WKS, TRANSABD, SINGLE GESTATION: ICD-10-PCS | Mod: S$GLB,,, | Performed by: OBSTETRICS & GYNECOLOGY

## 2023-12-14 PROCEDURE — 3074F PR MOST RECENT SYSTOLIC BLOOD PRESSURE < 130 MM HG: ICD-10-PCS | Mod: CPTII,S$GLB,, | Performed by: OBSTETRICS & GYNECOLOGY

## 2023-12-14 PROCEDURE — 3008F PR BODY MASS INDEX (BMI) DOCUMENTED: ICD-10-PCS | Mod: CPTII,S$GLB,, | Performed by: OBSTETRICS & GYNECOLOGY

## 2023-12-14 PROCEDURE — 1159F MED LIST DOCD IN RCRD: CPT | Mod: CPTII,S$GLB,, | Performed by: OBSTETRICS & GYNECOLOGY

## 2023-12-14 PROCEDURE — 3078F DIAST BP <80 MM HG: CPT | Mod: CPTII,S$GLB,, | Performed by: OBSTETRICS & GYNECOLOGY

## 2023-12-14 PROCEDURE — 3078F PR MOST RECENT DIASTOLIC BLOOD PRESSURE < 80 MM HG: ICD-10-PCS | Mod: CPTII,S$GLB,, | Performed by: OBSTETRICS & GYNECOLOGY

## 2023-12-14 NOTE — PROGRESS NOTES
Maternal Fetal Medicine Consult    Subjective     Patient ID: [id number]    Chief Complaint: MFM CONSULT W/US (Subchorionic hem (bleeding vaginally since the ), OB sono)      HPI 33 y.o.  female  at 9w6d gestation with Estimated Date of Delivery: 24. by early US and unknown LMP. She is sent for MFM consultation for subchorionic hemorrhage.  She had subchorionic hemorrhage noted on outside ultrasound.  She has had vaginal bleeding since  that started similar to a period.  She reports her bleeding is now very light spotting only when she wipes. She is Rh negative and received RhoGAM.  She has had 3 previous C-sections.  Her last  was 9 months ago.  She has history of  delivery at 27 weeks in her 1st pregnancy.  She reports she had she reports she had no symptoms prior to delivery and states that baby started coming out when she went to the restroom.  When she went to the hospital they tried to place her in Trendelenburg and delay labor, but she ended up needing an emergency . She has history of  cervical incompetence with cerclage placed early in the 2nd pregnancy.  She also received weekly [place] injections in that pregnancy and delivered at 38 weeks.  She reported that she had cervical surveillance last pregnancy with OB in [place], and underwent ultrasound indicated cerclage at 18 weeks, and was on nightly vaginal progesterone with delivery at 38 weeks.  She has increased BMI 35.5 on initial MFM consultation visit.  She denies any personal or family history of aneuploidy or anomalies. She denies any exposure to high fevers, viral rashes, illicit drugs or alcohol in this pregnancy.  She denies any leaking fluid, vaginal bleeding, contractions, decreased fetal movement. Denies headaches, visual disturbances, or epigastric pain.    Pregnancy complications include:   Patient Active Problem List   Diagnosis    Subchorionic hemorrhage    At high risk for complications  of intrauterine pregnancy (IUP)    Cervical incompetence during pregnancy in first trimester    History of cerclage, currently pregnant, first trimester    Rh negative state in antepartum period, first trimester    Vaginal bleeding affecting early pregnancy    Previous  section complicating pregnancy        Past Medical History:   Diagnosis Date    Rh incompatibility     b neg       Past Surgical History:   Procedure Laterality Date     SECTION      , ,      SECTION N/A 2023    Procedure:  SECTION;  Surgeon: Nader Sanchez MD;  Location: [facility] L&D;  Service: OB/GYN;  Laterality: N/A;    CHOLECYSTECTOMY  2017    REMOVAL OF CERCLAGE SUTURE N/A 2023    Procedure: REMOVAL, CERCLAGE SUTURE;  Surgeon: Nader Sanchez MD;  Location: [facility] L&D;  Service: OB/GYN;  Laterality: N/A;    TONSILLECTOMY AND ADENOIDECTOMY         Family History   Problem Relation Age of Onset    Eclampsia Mother        Social History     Socioeconomic History    Marital status: Single   Tobacco Use    Smoking status: Former     Average packs/day: 1.4 packs/day for 14.0 years (20.0 ttl pk-yrs)     Types: Cigarettes, Vaping with nicotine     Start date:      Quit date:      Years since quittin.9     Passive exposure: Past    Smokeless tobacco: Never   Substance and Sexual Activity    Alcohol use: Not Currently    Drug use: Never    Sexual activity: Not Currently       Current Outpatient Medications   Medication Sig Dispense Refill    prenatal vit/iron fum/folic ac (PRENATAL 1+1 ORAL) Take by mouth.       No current facility-administered medications for this visit.       Review of patient's allergies indicates:   Allergen Reactions    Bactrim [sulfamethoxazole-trimethoprim]        Medications:  Current Outpatient Medications   Medication Instructions    prenatal vit/iron fum/folic ac (PRENATAL 1+1 ORAL) Oral       Review of Systems   12 point review of systems conducted,  "negative except as stated in the history of present illness. See HPI for details.      Objective     Visit Vitals  /67 (BP Location: Left arm, Patient Position: Sitting, BP Method: Large (Automatic))   Pulse 80   Ht 5' 4" (1.626 m)   Wt 93.9 kg (207 lb)   LMP 10/16/2023 (Approximate)   BMI 35.53 kg/m²        Physical Exam  Vitals and nursing note reviewed.   Constitutional:       General: She is not in acute distress.     Appearance: Normal appearance.      Comments: Increased BMI   HENT:      Head: Normocephalic and atraumatic.      Nose: Nose normal. No congestion.      Mouth/Throat:      Pharynx: Oropharynx is clear.   Eyes:      General: No scleral icterus.     Pupils: Pupils are equal, round, and reactive to light.   Cardiovascular:      Rate and Rhythm: Normal rate and regular rhythm.   Pulmonary:      Effort: No respiratory distress.      Breath sounds: Normal breath sounds. No wheezing.   Abdominal:      General: Abdomen is flat.      Palpations: Abdomen is soft.      Tenderness: There is no abdominal tenderness. There is no right CVA tenderness, left CVA tenderness or guarding.      Comments: No CVA tenderness gravid uterus.    Musculoskeletal:         General: Normal range of motion.      Cervical back: Neck supple.      Right lower leg: No edema.      Left lower leg: No edema.   Skin:     General: Skin is warm.      Findings: No bruising or rash.   Neurological:      General: No focal deficit present.      Mental Status: She is oriented to person, place, and time.      Deep Tendon Reflexes: Reflexes normal.      Comments: Normal reflexes   Psychiatric:         Mood and Affect: Mood normal.         Behavior: Behavior normal.         Thought Content: Thought content normal.         Judgment: Judgment normal.         ASSESSMENT/PLAN:     33 y.o.  female with IUP at 9w6d    Subchorionic hemorrhage with history of vaginal bleeding  Viable IUP per ultrasound with subchorionic hemorrhage measuring " 2.4 x 0.6 cm.    She was advised that most patients with this do well in pregnancy. However, there is higher association of bleeding in later pregnancy as well as pregnancy loss (if less than 24 weeks) PPROM,  labor and delivery, especially in patient with bleeding episodes rather than asymptomatic subchorionic hemorrhage.  There is no prevention available and advised of need for pelvic rest x 2 weeks from last bleeding episode with expectant management will be done and advised to report any bleeding or increased cramps mainly in the second half of the pregnancy.      History of  delivery at 26 weeks in previous pregnancy with history of cerclage x2  I reviewed the patient's history which is remarkable for delivery at 27 weeks gestation after painless cervical dilation. and I reviewed the patient's history with diagnosis of cervical incompetence and previous cerclage done. We reviewed the concept of cervical insufficiency and how it differs from  labor. We discussed recommendations for management which include history indicated cervical cerclage placement. We reviewed the risks of cerclage procedure in detail. We discussed recommendations for pelvic rest, avoidance of moderate to high impact exercise, and no heavy lifting following cerclage placement.  Progesterone supplementation may be considered if short cervix, less than 3 cm but especially if cervix is less than 2.5 cm.     She was advised that risks of the cerclage including the risk of ruptured membrane at the time of surgery that could lead to pregnancy loss, risk of anesthesia, pain, infection, hemorrhage, risk of significant bleeding that could lead to pregnancy loss, hysterectomy in emergency, risk of injury to adjacent structures including risk of bladder, bowel, or ureter injury, risk of fistula formation, as well as the risk of prolonging the pregnancy to periviability with delivery of very premature fetus with all the  complications of prematurity. The potential inability to place cerclage and potential of failure to prevent  delivery were discussed.  Her questions were answered, and she would like to have the cerclage, which will be scheduled about 3 weeks .    Recommendations:  Patient scheduled for cerclage placement around 13 weeks.   Progesterone supplementation as discussed above  Routine cervical length monitoring is not recommended following cerclage placement, however will check 1 time at 16 weeks to decide on need for vaginal progesterone.  Monitor for signs of symptoms of  labor (LOF, vaginal bleeding, regular contractions) and signs of infection  To OB ED with concern for  labor or infection following cerclage placement  With planned repeat  section in the setting of 3 repeat  section it would be best to keep the cerclage and have it removed at the time of the       History of 3 previous C-sections  Discussed risk with previous  section, including improper placental implantation and abnormalities such as accreta, placental abruption and risks of uterine rupture with labor with need for emergency  section with  morbidity/mortality associated with that. She is to report any significant cramping or vaginal bleeding.       With previous  section, there is risk for placenta accreta that may not be detected antenatally. There is option to consider MRI if suspicion for placental abnormality. If no suspicion for placental abnormality, no MRI will be recommended and will do expectant management, understanding that neither the MRI nor the US is 100% sensitive or specific. Actually, most current data, suggest that an MRI is not superior to us, and should not be routinely done if suspected acreta, as both false positive and false negative are increased with such an approach.    With 3 previous  sections and history of previous  delivery  and a cerclage,  recommend delivery at 38 weeks' gestation (38-38 6/7th weeks) as optimal balance between prematurity risks and risks of continued pregnancy. Earlier delivery may be needed for worsening maternal or fetal status.      With history of multiple c-sections, advised against future pregnancy with risks as above. She verbalized understanding.        Increased BMI in pregnancy  Body mass index is 35.53 kg/m².    I discussed the risk of miscarriage in first trimester, recurrent miscarriages, congenital anomalies, hypertension, diabetes,  labor and the higher risk of  section and the higher risk of fetal demise in-utero. There is also higher risk of for excessive fetal weight and large for gestational age (LGA) fetuses. Mothers with LGA fetuses are at higher risk of prolonged labor,  delivery, shoulder dystocia and birth trauma. LGA neonates are increased risk of fetal hypoxia and intrauterine death, and are at risk to develop diabetes, obesity, metabolic syndrome, asthma and cancer later in life. She was advised of the importance of eating healthy and limiting weight gain to 11-20 lbs during the pregnancy, as optimal in this situation. I recommended low calorie, low fat diet avoiding any additional excessive weight gain. Excess weight gain would be associated with gestational hypertension, gestational diabetes and adverse  outcomes, including fetal demise in utero.    It is important to do FKC from 24 weeks till delivery.       Importance of working on losing weight after the pregnancy is over, especially before a future pregnancy was discussed. Breastfeeding may be an important tool in reducing the postpartum weight retention. Fetal risks were discussed with short term risk of fetal/ obesity and long term risk of adolescent component of metabolic syndrome.    Follow a healthy low caloric diet..       At high risk for preeclampsia  With her risk factors for  preeclampsia including  BMI over 30, mother with preeclampsia, and low socioeconomic status, discussed recommendations for low dose aspirin use to decrease risks for adverse outcomes, including preeclampsia, low birth rate and  delivery. Low-dose aspirin reduced the risk for preeclampsia by 15% in clinical trials and reduced the risk for  birth by 20% and FGR by 20%, and  mortality by around 20%.  After discussing risks/benefits of its use, it was agreed to start asa 81 mg daily after 12 weeks gestation and continue until delivery. and She will start 2 weeks from last bleeding.. Also, recommend using in all future pregnancies.    Follow up in about 2 weeks (around 2023) for MFM follow-up; 6 weeks for MFM follow-up transvaginal ultrasound.     No future appointments.       Reviewed with the patient the option of cervical surveillance versus repeating a cerclage and agreed to do the latter.  The risks of cerclage was discussed as mentioned in the informed consent, questions were answered in full informed consent was obtained.  Patient wants to do the cerclage that will be scheduled around 13 weeks.  The option of vaginal progesterone at 16 weeks, no vaginal progesterone, or vaginal progesterone if cervix is less than 3 cm discussed, along with lack of certainty in giving good recommendation, agreed to check the cervix at 16 weeks and 2 vaginal progesterone cervix less than 3 cm.  Recommended patient to start daily aspirin at 12 weeks if no bleeding occurs.  Counseled her on the risks of increased BMI and the importance of healthy eating and proper weight gain recommendations discussed.      Patient was evaluated by SHARI Monroe and Dr. Russo.  Final assessment and recommendations as stated above were made by Dr. Russo.    Components of this note were documented using voice recognition systems and are subject to errors not corrected at proofreading.  Please contact the author for any  clarifications.

## 2024-01-02 DIAGNOSIS — Z01.818 OTHER SPECIFIED PRE-OPERATIVE EXAMINATION: Primary | ICD-10-CM

## 2024-01-03 ENCOUNTER — LAB VISIT (OUTPATIENT)
Dept: LAB | Facility: HOSPITAL | Age: 34
End: 2024-01-03
Attending: OBSTETRICS & GYNECOLOGY
Payer: MEDICAID

## 2024-01-03 DIAGNOSIS — Z01.818 OTHER SPECIFIED PRE-OPERATIVE EXAMINATION: ICD-10-CM

## 2024-01-03 DIAGNOSIS — Z01.818 OTHER SPECIFIED PRE-OPERATIVE EXAMINATION: Primary | ICD-10-CM

## 2024-01-03 LAB
ANTIBODY IDENTIFICATION: NORMAL
APPEARANCE UR: ABNORMAL
BACTERIA #/AREA URNS AUTO: ABNORMAL /HPF
BASOPHILS # BLD AUTO: 0.02 X10(3)/MCL
BASOPHILS NFR BLD AUTO: 0.2 %
BILIRUB UR QL STRIP.AUTO: NEGATIVE
COLOR UR AUTO: YELLOW
EOSINOPHIL # BLD AUTO: 0.05 X10(3)/MCL (ref 0–0.9)
EOSINOPHIL NFR BLD AUTO: 0.5 %
ERYTHROCYTE [DISTWIDTH] IN BLOOD BY AUTOMATED COUNT: 12.6 % (ref 11.5–17)
GLUCOSE UR QL STRIP.AUTO: NORMAL
GROUP & RH: ABNORMAL
HCT VFR BLD AUTO: 39.3 % (ref 37–47)
HGB BLD-MCNC: 13.1 G/DL (ref 12–16)
IMM GRANULOCYTES # BLD AUTO: 0.04 X10(3)/MCL (ref 0–0.04)
IMM GRANULOCYTES NFR BLD AUTO: 0.4 %
INDIRECT COOMBS: ABNORMAL
KETONES UR QL STRIP.AUTO: NEGATIVE
LEUKOCYTE ESTERASE UR QL STRIP.AUTO: 500
LYMPHOCYTES # BLD AUTO: 2.62 X10(3)/MCL (ref 0.6–4.6)
LYMPHOCYTES NFR BLD AUTO: 28.1 %
MCH RBC QN AUTO: 28.2 PG (ref 27–31)
MCHC RBC AUTO-ENTMCNC: 33.3 G/DL (ref 33–36)
MCV RBC AUTO: 84.7 FL (ref 80–94)
MONOCYTES # BLD AUTO: 0.45 X10(3)/MCL (ref 0.1–1.3)
MONOCYTES NFR BLD AUTO: 4.8 %
NEUTROPHILS # BLD AUTO: 6.14 X10(3)/MCL (ref 2.1–9.2)
NEUTROPHILS NFR BLD AUTO: 66 %
NITRITE UR QL STRIP.AUTO: NEGATIVE
NRBC BLD AUTO-RTO: 0 %
PH UR STRIP.AUTO: 7.5 [PH]
PLATELET # BLD AUTO: 218 X10(3)/MCL (ref 130–400)
PMV BLD AUTO: 9.7 FL (ref 7.4–10.4)
PROT UR QL STRIP.AUTO: NEGATIVE
RBC # BLD AUTO: 4.64 X10(6)/MCL (ref 4.2–5.4)
RBC #/AREA URNS AUTO: ABNORMAL /HPF
RBC UR QL AUTO: NEGATIVE
SP GR UR STRIP.AUTO: 1.02 (ref 1–1.03)
SPECIMEN OUTDATE: ABNORMAL
SQUAMOUS #/AREA URNS LPF: ABNORMAL /HPF
UROBILINOGEN UR STRIP-ACNC: NORMAL
WBC # SPEC AUTO: 9.32 X10(3)/MCL (ref 4.5–11.5)
WBC #/AREA URNS AUTO: ABNORMAL /HPF

## 2024-01-03 PROCEDURE — 86850 RBC ANTIBODY SCREEN: CPT | Performed by: OBSTETRICS & GYNECOLOGY

## 2024-01-03 PROCEDURE — 86870 RBC ANTIBODY IDENTIFICATION: CPT | Performed by: OBSTETRICS & GYNECOLOGY

## 2024-01-03 PROCEDURE — 36415 COLL VENOUS BLD VENIPUNCTURE: CPT

## 2024-01-03 PROCEDURE — 87086 URINE CULTURE/COLONY COUNT: CPT

## 2024-01-03 NOTE — DISCHARGE INSTRUCTIONS
Patient Education       Cervical Cerclage Discharge Instructions   About this topic   Sometimes, a woman's cervix is at risk for opening too early which can lead to  birth. With a cervical cerclage, the doctor sews your cervix closed until the birth process starts.  What care is needed at home?   You will need to rest in bed for a few days. Ask your doctor when you can go back to your normal daily activities.  You may have light vaginal bleeding and mild cramping. Bleeding should stop after a few days.  You may have some thick vaginal drainage which may last for the rest of the time you are pregnant.  Your doctor may ask you to not to have sex for at least 1 week.  What follow-up care is needed?   Be sure to keep your follow up visit.  Remind your doctor that you have had a cervical cerclage.  The stitches will be taken out by your doctor when the birth process starts.  What problems could happen?   Reaction to any drugs used during surgery  Premature labor  Your water breaks  Infection  Bleeding  Tearing of the cervix or womb ? stitches must be removed before a vaginal child birth  Damage to the cervix  The cervix does not dilate normally during labor  When do I need to call the doctor?   Signs of infection, such as a fever of 100.4°F (38°C) or higher, chills, pain with passing urine  Signs of labor like contractions or lower back pain  Belly pain or cramping  Water breaks or leaks  Vaginal bleeding  Upset stomach or throwing up

## 2024-01-06 LAB — BACTERIA UR CULT: NORMAL

## 2024-01-09 DIAGNOSIS — O34.31 CERVICAL INCOMPETENCE DURING PREGNANCY IN FIRST TRIMESTER: Primary | ICD-10-CM

## 2024-01-09 RX ORDER — INDOMETHACIN 25 MG/1
25 CAPSULE ORAL
Qty: 8 CAPSULE | Refills: 0 | Status: ON HOLD | OUTPATIENT
Start: 2024-01-09 | End: 2024-01-10

## 2024-01-10 ENCOUNTER — HOSPITAL ENCOUNTER (OUTPATIENT)
Facility: HOSPITAL | Age: 34
Discharge: HOME | End: 2024-01-10
Attending: OBSTETRICS & GYNECOLOGY | Admitting: OBSTETRICS & GYNECOLOGY
Payer: MEDICAID

## 2024-01-10 ENCOUNTER — ANESTHESIA (OUTPATIENT)
Dept: SURGERY | Facility: HOSPITAL | Age: 34
End: 2024-01-10
Payer: MEDICAID

## 2024-01-10 ENCOUNTER — ANESTHESIA EVENT (OUTPATIENT)
Dept: SURGERY | Facility: HOSPITAL | Age: 34
End: 2024-01-10
Payer: MEDICAID

## 2024-01-10 DIAGNOSIS — O34.31 MATERNAL CARE FOR CERVICAL INCOMPETENCE IN FIRST TRIMESTER: Primary | ICD-10-CM

## 2024-01-10 PROCEDURE — 36000706: Performed by: OBSTETRICS & GYNECOLOGY

## 2024-01-10 PROCEDURE — 37000008 HC ANESTHESIA 1ST 15 MINUTES: Performed by: OBSTETRICS & GYNECOLOGY

## 2024-01-10 PROCEDURE — D9220A PRA ANESTHESIA: Mod: ANES,,, | Performed by: ANESTHESIOLOGY

## 2024-01-10 PROCEDURE — 71000033 HC RECOVERY, INTIAL HOUR: Performed by: OBSTETRICS & GYNECOLOGY

## 2024-01-10 PROCEDURE — 63600175 PHARM REV CODE 636 W HCPCS: Mod: JZ,JG | Performed by: ANESTHESIOLOGY

## 2024-01-10 PROCEDURE — 37000009 HC ANESTHESIA EA ADD 15 MINS: Performed by: OBSTETRICS & GYNECOLOGY

## 2024-01-10 PROCEDURE — 71000016 HC POSTOP RECOV ADDL HR: Performed by: OBSTETRICS & GYNECOLOGY

## 2024-01-10 PROCEDURE — D9220A PRA ANESTHESIA: Mod: CRNA,,, | Performed by: NURSE ANESTHETIST, CERTIFIED REGISTERED

## 2024-01-10 PROCEDURE — 63600175 PHARM REV CODE 636 W HCPCS: Performed by: NURSE ANESTHETIST, CERTIFIED REGISTERED

## 2024-01-10 PROCEDURE — 71000015 HC POSTOP RECOV 1ST HR: Performed by: OBSTETRICS & GYNECOLOGY

## 2024-01-10 PROCEDURE — 25000003 PHARM REV CODE 250: Performed by: ANESTHESIOLOGY

## 2024-01-10 PROCEDURE — 63600175 PHARM REV CODE 636 W HCPCS: Performed by: ANESTHESIOLOGY

## 2024-01-10 PROCEDURE — 59320 REVISION OF CERVIX: CPT | Mod: TH,,, | Performed by: OBSTETRICS & GYNECOLOGY

## 2024-01-10 PROCEDURE — 36000707: Performed by: OBSTETRICS & GYNECOLOGY

## 2024-01-10 RX ORDER — INDOMETHACIN 50 MG/1
50 SUPPOSITORY RECTAL ONCE
Status: DISCONTINUED | OUTPATIENT
Start: 2024-01-10 | End: 2024-01-10

## 2024-01-10 RX ORDER — BUPIVACAINE HYDROCHLORIDE 7.5 MG/ML
INJECTION, SOLUTION EPIDURAL; RETROBULBAR
Status: DISCONTINUED | OUTPATIENT
Start: 2024-01-10 | End: 2024-01-10

## 2024-01-10 RX ORDER — MEPERIDINE HYDROCHLORIDE 25 MG/ML
12.5 INJECTION INTRAMUSCULAR; INTRAVENOUS; SUBCUTANEOUS ONCE
Status: DISCONTINUED | OUTPATIENT
Start: 2024-01-10 | End: 2024-01-10 | Stop reason: HOSPADM

## 2024-01-10 RX ORDER — ACETAMINOPHEN 500 MG
1000 TABLET ORAL ONCE
Status: COMPLETED | OUTPATIENT
Start: 2024-01-10 | End: 2024-01-10

## 2024-01-10 RX ORDER — HYDROMORPHONE HYDROCHLORIDE 2 MG/ML
0.5 INJECTION, SOLUTION INTRAMUSCULAR; INTRAVENOUS; SUBCUTANEOUS EVERY 5 MIN PRN
Status: DISCONTINUED | OUTPATIENT
Start: 2024-01-10 | End: 2024-01-10 | Stop reason: HOSPADM

## 2024-01-10 RX ORDER — BUPIVACAINE HYDROCHLORIDE 7.5 MG/ML
INJECTION, SOLUTION EPIDURAL; RETROBULBAR
Status: COMPLETED | OUTPATIENT
Start: 2024-01-10 | End: 2024-01-10

## 2024-01-10 RX ORDER — HYDROCODONE BITARTRATE AND ACETAMINOPHEN 5; 325 MG/1; MG/1
1 TABLET ORAL
Status: DISCONTINUED | OUTPATIENT
Start: 2024-01-10 | End: 2024-01-10 | Stop reason: HOSPADM

## 2024-01-10 RX ORDER — FAMOTIDINE 10 MG/ML
20 INJECTION INTRAVENOUS ONCE
Status: COMPLETED | OUTPATIENT
Start: 2024-01-10 | End: 2024-01-10

## 2024-01-10 RX ORDER — LIDOCAINE HYDROCHLORIDE 10 MG/ML
1 INJECTION, SOLUTION EPIDURAL; INFILTRATION; INTRACAUDAL; PERINEURAL ONCE
Status: DISCONTINUED | OUTPATIENT
Start: 2024-01-10 | End: 2024-01-10 | Stop reason: HOSPADM

## 2024-01-10 RX ORDER — HYDROMORPHONE HYDROCHLORIDE 2 MG/ML
0.2 INJECTION, SOLUTION INTRAMUSCULAR; INTRAVENOUS; SUBCUTANEOUS EVERY 5 MIN PRN
Status: DISCONTINUED | OUTPATIENT
Start: 2024-01-10 | End: 2024-01-10 | Stop reason: HOSPADM

## 2024-01-10 RX ORDER — SODIUM CHLORIDE 9 MG/ML
INJECTION, SOLUTION INTRAVENOUS CONTINUOUS
Status: DISCONTINUED | OUTPATIENT
Start: 2024-01-10 | End: 2024-01-10 | Stop reason: HOSPADM

## 2024-01-10 RX ORDER — DIPHENHYDRAMINE HYDROCHLORIDE 50 MG/ML
25 INJECTION, SOLUTION INTRAMUSCULAR; INTRAVENOUS EVERY 6 HOURS PRN
Status: DISCONTINUED | OUTPATIENT
Start: 2024-01-10 | End: 2024-01-10 | Stop reason: HOSPADM

## 2024-01-10 RX ORDER — METOCLOPRAMIDE HYDROCHLORIDE 5 MG/ML
10 INJECTION INTRAMUSCULAR; INTRAVENOUS ONCE
Status: COMPLETED | OUTPATIENT
Start: 2024-01-10 | End: 2024-01-10

## 2024-01-10 RX ORDER — IPRATROPIUM BROMIDE AND ALBUTEROL SULFATE 2.5; .5 MG/3ML; MG/3ML
3 SOLUTION RESPIRATORY (INHALATION) ONCE AS NEEDED
Status: DISCONTINUED | OUTPATIENT
Start: 2024-01-10 | End: 2024-01-10 | Stop reason: HOSPADM

## 2024-01-10 RX ORDER — ONDANSETRON 2 MG/ML
4 INJECTION INTRAMUSCULAR; INTRAVENOUS ONCE
Status: DISCONTINUED | OUTPATIENT
Start: 2024-01-10 | End: 2024-01-10 | Stop reason: HOSPADM

## 2024-01-10 RX ORDER — METOCLOPRAMIDE HYDROCHLORIDE 5 MG/ML
10 INJECTION INTRAMUSCULAR; INTRAVENOUS EVERY 10 MIN PRN
Status: DISCONTINUED | OUTPATIENT
Start: 2024-01-10 | End: 2024-01-10 | Stop reason: HOSPADM

## 2024-01-10 RX ADMIN — BUPIVACAINE HYDROCHLORIDE 1.2 ML: 7.5 INJECTION, SOLUTION EPIDURAL; RETROBULBAR at 07:01

## 2024-01-10 RX ADMIN — SODIUM CHLORIDE, POTASSIUM CHLORIDE, SODIUM LACTATE AND CALCIUM CHLORIDE: 600; 310; 30; 20 INJECTION, SOLUTION INTRAVENOUS at 07:01

## 2024-01-10 RX ADMIN — ACETAMINOPHEN 1000 MG: 500 TABLET ORAL at 06:01

## 2024-01-10 RX ADMIN — METOCLOPRAMIDE 10 MG: 5 INJECTION, SOLUTION INTRAMUSCULAR; INTRAVENOUS at 06:01

## 2024-01-10 RX ADMIN — FAMOTIDINE 20 MG: 10 INJECTION, SOLUTION INTRAVENOUS at 06:01

## 2024-01-10 NOTE — OP NOTE
Silver cervical cerclage operative report    Date of exam: 01/10/2024     Preoperative diagnosis: 13w5d  gestation with cervical incompetence    Postoperative diagnosis: Same    Operation: Silver cervical cerclage (1 suture #5 Ethibond)    Anesthesia: Spinal    Estimated blood loss: Less than 10 mL of blood    Complications: None    Operative procedure: Patient was placed upon the operating table in a dorsal lithotomy position after a spinal anesthetic was started by the anesthesia team.  The perineum was prepped with antiseptic solution, and the vagina was prepped with direct visualization with Betadine solution.  Cervix was closed and about 2 cm long.  With the help of 2 assistants and using the right angle retractors, there was adequate visualization of the cervix.  An Silver cervical cerclage was placed in a pursestring fashion, starting at 11:00, going as high as a at the cervicovaginal junction as possible, and including cervical stroma in a circumferential manner and finishing at 1:00.  The suture was tied with multiple knots around 12:00 and an extra 2 cm of suture was left at the edge of the knot for later visualization.  Patient tolerated procedure well and was taken out of the operating room in a stable condition.  Postop FHR check was done and was normal.

## 2024-01-10 NOTE — CARE UPDATE
Patients movement of bilateral lower limbs increasing, she can bend both knees slightly now and gross motor movement increasing bilaterally. No fine motor movement of toes present as of yet. Updated Dr. Portillo on her status, v/s.  He approved her transfer to her room at any time.

## 2024-01-10 NOTE — CARE UPDATE
FHT's not able to be detected-surgeon is aware-no new orders given. Patient voices no c/o, abdomen remains soft to touch

## 2024-01-10 NOTE — PLAN OF CARE
Patients discharge to her post op room approved per Dr. Portillo and per her Lisa score. She has sensation to bilateral lower limbs from toes to waist. She has gross motor to bilateral lower limbs and can bend both legs at the knee-no fine motor movement to toes at present. T's reman difficult to locate. Surgeon is aware-no new orders given.

## 2024-01-10 NOTE — ANESTHESIA PROCEDURE NOTES
Spinal    Diagnosis: cervical incompetence  Patient location during procedure: OR  Start time: 1/10/2024 7:13 AM  Timeout: 1/10/2024 7:13 AM  End time: 1/10/2024 7:16 AM    Staffing  Authorizing Provider: Lalit Portillo DO  Performing Provider: Lalit Portillo DO    Staffing  Performed by: Lalit Portillo DO  Authorized by: Lalit Portillo DO    Spinal Block  Patient position: sitting  Prep: Betadine  Patient monitoring: heart rate, cardiac monitor, continuous pulse ox and frequent blood pressure checks  Approach: midline  Location: L3-4  Injection technique: single shot  CSF Fluid: clear free-flowing CSF  Needle  Needle type: Oscar   Needle gauge: 25 G  Needle length: 3.5 in  Additional Documentation: negative aspiration for heme and no paresthesia on injection  Needle localization: anatomical landmarks  Assessment   Dermatomal levels determined by alcohol wipe  Medications:    Medications: bupivacaine (pf) (MARCAINE) injection 0.75% - Intraspinal   1.2 mL - 1/10/2024 7:22:00 AM

## 2024-01-10 NOTE — CARE UPDATE
FHT's are difficult to locate. Patient stated they were not able to be located in pre-op.  Will continue to attempt to locate.

## 2024-01-10 NOTE — ANESTHESIA PREPROCEDURE EVALUATION
01/10/2024  Tori Bojorquez is a 33 y.o., female presenting for cervical cerclage.    Other Medical History   Rh incompatibility Chronic hematoma ABOVE PLACENTA   Cervical incompetence during pregnancy in second trimester      Surgical History       SECTION CHOLECYSTECTOMY   TONSILLECTOMY AND ADENOIDECTOMY REMOVAL OF CERCLAGE SUTURE    SECTION      Pre-op Assessment    I have reviewed the Patient Summary Reports.     I have reviewed the Nursing Notes. I have reviewed the NPO Status.   I have reviewed the Medications.     Review of Systems  Anesthesia Hx:  No problems with previous Anesthesia                Social:  Non-Smoker           Physical Exam  General: Well nourished, Cooperative, Alert and Oriented    Airway:  Mallampati: III   Mouth Opening: Normal  TM Distance: Normal  Tongue: Normal  Neck ROM: Normal ROM    Dental:  Intact    Chest/Lungs:  Clear to auscultation, Normal Respiratory Rate    Heart:  Rate: Normal  Rhythm: Regular Rhythm  Sounds: Normal    Abdomen:  Normal, Soft, Nontender        Anesthesia Plan  Type of Anesthesia, risks & benefits discussed:    Anesthesia Type: Gen Supraglottic Airway  Intra-op Monitoring Plan: Standard ASA Monitors  Post Op Pain Control Plan: multimodal analgesia  Induction:  IV  Airway Plan: Direct  Informed Consent: Informed consent signed with the Patient and all parties understand the risks and agree with anesthesia plan.  All questions answered.   ASA Score: 2  Day of Surgery Review of History & Physical: H&P Update referred to the surgeon/provider.    Ready For Surgery From Anesthesia Perspective.     .

## 2024-01-10 NOTE — CARE UPDATE
"Patients states, "I was given my rogam injection for this pregnancy  at 9 weeks  at [facility].  "

## 2024-01-10 NOTE — ANESTHESIA PROCEDURE NOTES
Spinal    Diagnosis: cervical incompetence  Patient location during procedure: OR  Start time: 1/10/2024 7:12 AM  Timeout: 1/10/2024 7:12 AM    Staffing  Authorizing Provider: Lalti Portillo DO  Performing Provider: Joyce Nascimento CRNA    Staffing  Performed by: Joyce Nascimento CRNA  Authorized by: Lalit Portillo DO    Preanesthetic Checklist  Completed: patient identified, IV checked, site marked, risks and benefits discussed, surgical consent, monitors and equipment checked, pre-op evaluation and timeout performed  Spinal Block  Patient position: sitting  Prep: Betadine  Patient monitoring: heart rate, continuous pulse ox and frequent blood pressure checks  Approach: midline  Location: L3-4  Injection technique: single shot  CSF Fluid: clear free-flowing CSF  Needle  Needle type: Oscar   Needle gauge: 25 G  Needle length: 3.5 in  Additional Documentation: negative aspiration for heme  Needle localization: anatomical landmarks  Assessment  Patient's tolerance of the procedure: comfortable throughout block  Medications:    Medications: bupivacaine (pf) (MARCAINE) injection 0.75% - Intraspinal   1.2 mL - 1/10/2024 7:15:00 AM

## 2024-01-10 NOTE — CARE UPDATE
Received patient from the OR, she is awake and alert x4, denied c/o, respirations full-regular-deep-reassured her. She has sensation to bilateral legs from toes to upper thighs, no movement to legs d/t pre-op spinal. Abdomen is soft

## 2024-01-10 NOTE — H&P
BIN christianson P is the note on 12/14 and this is an update.  Patient had no complaints.  There has been no change in history or physical examination.  She will be having a spinal anesthesia with a cerclage.

## 2024-01-10 NOTE — CARE UPDATE
Patient now has slight gross motor movement to bilateral legs-more on the left than the right at present.

## 2024-01-10 NOTE — ANESTHESIA POSTPROCEDURE EVALUATION
Anesthesia Post Evaluation    Patient: Tori Bojorquez    Procedure(s) Performed: Procedure(s) (LRB):  CERCLAGE, CERVIX (N/A)    Final Anesthesia Type: spinal      Patient location during evaluation: PACU  Patient participation: Yes- Able to Participate  Level of consciousness: awake and alert  Post-procedure vital signs: reviewed and stable  Pain management: adequate  Airway patency: patent  BRUNO mitigation strategies: Multimodal analgesia  PONV status at discharge: No PONV  Anesthetic complications: no      Cardiovascular status: hemodynamically stable  Respiratory status: unassisted  Hydration status: euvolemic  Follow-up not needed.              Vitals Value Taken Time   /53 01/10/24 0835   Temp 36.7 °C (98.1 °F) 01/10/24 0835   Pulse 68 01/10/24 0835   Resp 18 01/10/24 0835   SpO2 100 % 01/10/24 0835         Event Time   Out of Recovery 08:38:00         Pain/Lisa Score: Pain Rating Prior to Med Admin: 0 (1/10/2024  6:40 AM)  Lisa Score: 10 (1/10/2024  8:40 AM)

## 2024-01-11 ENCOUNTER — OFFICE VISIT (OUTPATIENT)
Dept: MATERNAL FETAL MEDICINE | Facility: CLINIC | Age: 34
End: 2024-01-11
Payer: MEDICAID

## 2024-01-11 VITALS
WEIGHT: 208 LBS | BODY MASS INDEX: 35.51 KG/M2 | HEIGHT: 64 IN | DIASTOLIC BLOOD PRESSURE: 60 MMHG | SYSTOLIC BLOOD PRESSURE: 92 MMHG | HEART RATE: 72 BPM

## 2024-01-11 DIAGNOSIS — Z67.91 RH NEGATIVE STATE IN ANTEPARTUM PERIOD, FIRST TRIMESTER: ICD-10-CM

## 2024-01-11 DIAGNOSIS — O46.8X2 SUBCHORIONIC HEMORRHAGE OF PLACENTA IN SECOND TRIMESTER, SINGLE OR UNSPECIFIED FETUS: ICD-10-CM

## 2024-01-11 DIAGNOSIS — O09.90 AT HIGH RISK FOR COMPLICATIONS OF INTRAUTERINE PREGNANCY (IUP): Primary | ICD-10-CM

## 2024-01-11 DIAGNOSIS — O34.31 MATERNAL CARE FOR CERVICAL INCOMPETENCE IN FIRST TRIMESTER: ICD-10-CM

## 2024-01-11 DIAGNOSIS — O26.891 RH NEGATIVE STATE IN ANTEPARTUM PERIOD, FIRST TRIMESTER: ICD-10-CM

## 2024-01-11 DIAGNOSIS — O34.219 PREVIOUS CESAREAN SECTION COMPLICATING PREGNANCY: ICD-10-CM

## 2024-01-11 DIAGNOSIS — O41.8X20 SUBCHORIONIC HEMORRHAGE OF PLACENTA IN SECOND TRIMESTER, SINGLE OR UNSPECIFIED FETUS: ICD-10-CM

## 2024-01-11 DIAGNOSIS — Z98.890 HISTORY OF CERCLAGE, CURRENTLY PREGNANT, FIRST TRIMESTER: ICD-10-CM

## 2024-01-11 DIAGNOSIS — O09.291 HISTORY OF CERCLAGE, CURRENTLY PREGNANT, FIRST TRIMESTER: ICD-10-CM

## 2024-01-11 DIAGNOSIS — O20.9 VAGINAL BLEEDING AFFECTING EARLY PREGNANCY: ICD-10-CM

## 2024-01-11 PROCEDURE — 99499 UNLISTED E&M SERVICE: CPT | Mod: S$GLB,,, | Performed by: OBSTETRICS & GYNECOLOGY

## 2024-01-11 RX ORDER — CALCIUM POLYCARBOPHIL 625 MG
625 TABLET ORAL DAILY
COMMUNITY

## 2024-01-11 NOTE — PROGRESS NOTES
Maternal Fetal Medicine Follow Up    Subjective     Patient ID: [id number]    Chief Complaint: MFM follow up  (Cerclage 1/10/2024.  Patient is having mild cramping.)      HPI: Tori Bojorquez is a 33 y.o. female  at 13w6d gestation with Estimated Date of Delivery: 24  who is here for follow  up consultation by M.    She had subchorionic hemorrhage noted on outside ultrasound.  She has had vaginal bleeding since  that started similar to a period.  She reports her bleeding is now resolved, has not had any bleeding since mid December. She is Rh negative and received RhoGAM on 23.  She has had 3 previous C-sections.  Her last  was 9 months ago.  She has history of  delivery at 27 weeks in her 1st pregnancy.  She reports she had she reports she had no symptoms prior to delivery and states that baby started coming out when she went to the restroom.  When she went to the hospital they tried to place her in Trendelenburg and delay labor, but she ended up needing an emergency . She has history of cervical incompetence with cerclage placed early in the 2nd pregnancy.  She also received weekly [place] injections in that pregnancy and delivered at 38 weeks.  She reported that she had cervical surveillance last pregnancy with OB in [place], and underwent ultrasound indicated cerclage at 18 weeks, and was on nightly vaginal progesterone with delivery at 38 weeks.  She is s/p prophylactic cerclage on 1/10/2024.  She has increased BMI 35.5 on initial MFM consultation visit.  She has not yet started the recommended prophylactic low-dose aspirin daily, plans to start it tomorrow.       Interval history since last MFM visit: None.. She denies any leaking fluid, vaginal bleeding, contractions, decreased fetal movement. Denies headaches, visual disturbances, or epigastric pain.    Pregnancy complications include:   Patient Active Problem List   Diagnosis    Subchorionic hemorrhage  "   At high risk for complications of intrauterine pregnancy (IUP)    Maternal care for cervical incompetence in first trimester    History of cerclage, currently pregnant, first trimester    Rh negative state in antepartum period, first trimester    Vaginal bleeding affecting early pregnancy    Previous  section complicating pregnancy        No changes to medical, surgical, family, social, or obstetric history.    Medications:  Current Outpatient Medications   Medication Instructions    polycarbophil (FIBERCON) 625 mg, Oral, Daily    prenatal vit/iron fum/folic ac (PRENATAL 1+1 ORAL) Oral       Review of Systems   12 point review of systems conducted, negative except as stated in the history of present illness. See HPI for details.      Objective     Visit Vitals  BP 92/60 (BP Location: Right arm, Patient Position: Sitting, BP Method: Large (Automatic))   Pulse 72   Ht 5' 4" (1.626 m)   Wt 94.3 kg (208 lb)   LMP 10/16/2023 (Approximate)   BMI 35.70 kg/m²        Physical Exam  Vitals and nursing note reviewed.   Constitutional:       Appearance: Normal appearance.      Comments: Increased BMI   HENT:      Head: Normocephalic and atraumatic.      Nose: Nose normal. No congestion.   Cardiovascular:      Rate and Rhythm: Normal rate.   Pulmonary:      Effort: Pulmonary effort is normal.   Skin:     Findings: No rash.   Neurological:      Mental Status: She is alert and oriented to person, place, and time.   Psychiatric:         Mood and Affect: Mood normal.         Behavior: Behavior normal.         Thought Content: Thought content normal.         Judgment: Judgment normal.           ASSESSMENT/PLAN:     33 y.o.  female with IUP at 13w6d    Subchorionic hemorrhage with history of vaginal bleeding  Viable IUP per ultrasound with subchorionic hemorrhage measuring 2.4 x 0.6 cm consult visit.     per Doppler today.     She was advised that most patients with this do well in pregnancy. However, there is " higher association of bleeding in later pregnancy as well as pregnancy loss (if less than 24 weeks) PPROM,  labor and delivery, especially in patient with bleeding episodes rather than asymptomatic subchorionic hemorrhage.  There is no prevention available and advised of need for pelvic rest x 2 weeks from last bleeding episode with expectant management will be done and advised to report any bleeding or increased cramps mainly in the second half of the pregnancy.      History of  delivery at 26 weeks in previous pregnancy with history of cerclage x2  She is aware of risks associated with cerclage placement and that goal is to decrease risk of  labor and delivery. She is to continue limited activity/home rest with ambulation/exercising BLE for DVT prophylaxis, no tub baths, and pelvic rest. She was instructed to report immediately any abdominal cramping, vaginal bleeding or change in discharge.     We will plan to check cervical length 1 time around 16 weeks.    Will plan for cerclage removal at 36-37 weeks, unless indicated earlier.  PTL precautions reviewed.       History of 3 previous C-sections  She is aware of risk with previous  section, including improper placental implantation and abnormalities such as accreta, placental abruption and risks of uterine rupture with labor with need for emergency  section with  morbidity/mortality associated with that. She is to report any significant cramping or vaginal bleeding.       With repeat cesareans, standard of care is to deliver at 39 weeks, if there are no other obstetric risk factor for earlier delivery. With multiple  sections, there is greater potential for placental abruption, uterine rupture and improper placental implantation not detected antenatally requiring emergency delivery, if labor ensues. Although standard of care does not endorse delivery prior to 39 weeks, there is some data suggesting worsening   outcomes with delivery after 38 weeks. It may be reasonable and beneficial to deliver at 38 -39 weeks, understanding risks for issues of prematurity and need for NICU admission in this setting.    With history of multiple c-sections, previously advised against future pregnancy with risks as above.        Increased BMI in pregnancy  Body mass index is 35.7 kg/m². With  1 lb gain since last visit, she was advised to continue healthy and low caloric diet.  Excess weight gain would be associated with gestational hypertension, gestational diabetes and adverse  outcomes, including fetal demise in utero.    With risk factors associated with increased BMI, she is to do fetal kick counts throughout the pregnancy (after 24 weeks).    It is important to lose weight after the pregnancy is over, especially before a future pregnancy was discussed. Breastfeeding may be an important tool in reducing the postpartum weight retention. Fetal risks were discussed with short term risk of fetal/ obesity and long term risk of adolescent component of metabolic syndrome.       At high risk for preeclampsia  With her increased risk for preeclampsia, she agreed to start asa 81 mg daily today and continue until delivery.. Preeclampsia precautions reviewed.   Normal postop evaluation.  We will start aspirin tomorrow.  We will see her in a few weeks.        Follow up for Keep return appointment.     Future Appointments   Date Time Provider Department Center   2024  1:00 PM Sanket Russo MD [facility]   2024  [facility]        DESMOND involvement: Patient was evaluated by SHARI Monroe and Dr. Russo.  Final assessment and recommendations as stated above were made by Dr. Russo.    This note was created with the assistance of [employer] voice recognition software. There may be transcription errors as a result of using this technology, however minimal. Effort has been  made to ensure accuracy of transcription, but any obvious errors or omissions should be clarified with the author of the document.

## 2024-01-14 VITALS
TEMPERATURE: 98 F | WEIGHT: 208.13 LBS | OXYGEN SATURATION: 99 % | HEIGHT: 64 IN | BODY MASS INDEX: 35.53 KG/M2 | SYSTOLIC BLOOD PRESSURE: 104 MMHG | HEART RATE: 74 BPM | DIASTOLIC BLOOD PRESSURE: 63 MMHG | RESPIRATION RATE: 18 BRPM

## 2024-02-01 ENCOUNTER — PROCEDURE VISIT (OUTPATIENT)
Dept: MATERNAL FETAL MEDICINE | Facility: CLINIC | Age: 34
End: 2024-02-01
Payer: MEDICAID

## 2024-02-01 ENCOUNTER — OFFICE VISIT (OUTPATIENT)
Dept: MATERNAL FETAL MEDICINE | Facility: CLINIC | Age: 34
End: 2024-02-01
Payer: MEDICAID

## 2024-02-01 VITALS
HEART RATE: 74 BPM | SYSTOLIC BLOOD PRESSURE: 113 MMHG | WEIGHT: 211 LBS | HEIGHT: 64 IN | DIASTOLIC BLOOD PRESSURE: 79 MMHG | BODY MASS INDEX: 36.02 KG/M2

## 2024-02-01 DIAGNOSIS — O99.212 OBESITY AFFECTING PREGNANCY IN SECOND TRIMESTER, UNSPECIFIED OBESITY TYPE: ICD-10-CM

## 2024-02-01 DIAGNOSIS — O34.32 MATERNAL CARE FOR CERVICAL INCOMPETENCE IN SECOND TRIMESTER: ICD-10-CM

## 2024-02-01 DIAGNOSIS — O09.292 HISTORY OF CERCLAGE, CURRENTLY PREGNANT, SECOND TRIMESTER: ICD-10-CM

## 2024-02-01 DIAGNOSIS — E66.01 MORBID OBESITY: ICD-10-CM

## 2024-02-01 DIAGNOSIS — Z67.91 RH NEGATIVE STATE IN ANTEPARTUM PERIOD, FIRST TRIMESTER: ICD-10-CM

## 2024-02-01 DIAGNOSIS — O26.892 RH NEGATIVE STATUS DURING PREGNANCY IN SECOND TRIMESTER: ICD-10-CM

## 2024-02-01 DIAGNOSIS — O26.872 PREGNANCY COMPLICATED BY CERVICAL SHORTENING IN SECOND TRIMESTER: ICD-10-CM

## 2024-02-01 DIAGNOSIS — Z67.91 RH NEGATIVE STATUS DURING PREGNANCY IN SECOND TRIMESTER: ICD-10-CM

## 2024-02-01 DIAGNOSIS — Z98.890 HISTORY OF CERCLAGE, CURRENTLY PREGNANT, SECOND TRIMESTER: ICD-10-CM

## 2024-02-01 DIAGNOSIS — O34.219 PREVIOUS CESAREAN SECTION COMPLICATING PREGNANCY: ICD-10-CM

## 2024-02-01 DIAGNOSIS — O26.872 CERVICAL SHORTENING IN SECOND TRIMESTER: ICD-10-CM

## 2024-02-01 DIAGNOSIS — O09.90 AT HIGH RISK FOR COMPLICATIONS OF INTRAUTERINE PREGNANCY (IUP): ICD-10-CM

## 2024-02-01 DIAGNOSIS — O34.31 MATERNAL CARE FOR CERVICAL INCOMPETENCE IN FIRST TRIMESTER: ICD-10-CM

## 2024-02-01 DIAGNOSIS — O09.90 AT HIGH RISK FOR COMPLICATIONS OF INTRAUTERINE PREGNANCY (IUP): Primary | ICD-10-CM

## 2024-02-01 DIAGNOSIS — O26.891 RH NEGATIVE STATE IN ANTEPARTUM PERIOD, FIRST TRIMESTER: ICD-10-CM

## 2024-02-01 PROBLEM — O20.9 VAGINAL BLEEDING AFFECTING EARLY PREGNANCY: Status: RESOLVED | Noted: 2023-12-14 | Resolved: 2024-02-01

## 2024-02-01 PROCEDURE — 1159F MED LIST DOCD IN RCRD: CPT | Mod: CPTII,S$GLB,, | Performed by: OBSTETRICS & GYNECOLOGY

## 2024-02-01 PROCEDURE — 3078F DIAST BP <80 MM HG: CPT | Mod: CPTII,S$GLB,, | Performed by: OBSTETRICS & GYNECOLOGY

## 2024-02-01 PROCEDURE — 99213 OFFICE O/P EST LOW 20 MIN: CPT | Mod: TH,S$GLB,, | Performed by: OBSTETRICS & GYNECOLOGY

## 2024-02-01 PROCEDURE — 3074F SYST BP LT 130 MM HG: CPT | Mod: CPTII,S$GLB,, | Performed by: OBSTETRICS & GYNECOLOGY

## 2024-02-01 PROCEDURE — 76817 TRANSVAGINAL US OBSTETRIC: CPT | Mod: S$GLB,,, | Performed by: OBSTETRICS & GYNECOLOGY

## 2024-02-01 PROCEDURE — 3008F BODY MASS INDEX DOCD: CPT | Mod: CPTII,S$GLB,, | Performed by: OBSTETRICS & GYNECOLOGY

## 2024-02-01 RX ORDER — NAPROXEN SODIUM 220 MG/1
81 TABLET, FILM COATED ORAL DAILY
COMMUNITY

## 2024-02-01 RX ORDER — PROGESTERONE 200 MG/1
200 CAPSULE ORAL NIGHTLY
Qty: 30 CAPSULE | Refills: 4 | Status: SHIPPED | OUTPATIENT
Start: 2024-02-01 | End: 2024-06-30

## 2024-02-01 NOTE — PROGRESS NOTES
[facility]  Maternal Fetal Medicine Follow Up    Subjective     Patient ID: [id number]    Chief Complaint: MFM follow up with US (H/o PTD)      HPI: Tori Bojorquez is a 33 y.o. female  at 16w6d gestation with Estimated Date of Delivery: 24  who is here for follow  up consultation by MFM.    She had subchorionic hemorrhage noted on outside ultrasound.  She has had vaginal bleeding since  that started similar to a period.  She reports her bleeding is now resolved, has not had any bleeding since mid December. She is Rh negative and received RhoGAM on 23.  She has had 3 previous C-sections.  Her last  was 9 months ago.  She has history of  delivery at 27 weeks in her 1st pregnancy.  She reports she had she reports she had no symptoms prior to delivery and states that baby started coming out when she went to the restroom.  When she went to the hospital they tried to place her in Trendelenburg and delay labor, but she ended up needing an emergency . She has history of cervical incompetence with cerclage placed early in the 2nd pregnancy.  She also received weekly [place] injections in that pregnancy and delivered at 38 weeks.  She reported that she had cervical surveillance last pregnancy with OB in [place], and underwent ultrasound indicated cerclage at 18 weeks, and was on nightly vaginal progesterone with delivery at 38 weeks.  She is s/p prophylactic cerclage on 1/10/2024.  She has increased BMI 35.5 on initial MFM consultation visit.  She is on prophylactic low-dose aspirin daily.       Interval history since last MFM visit: None.. She denies any leaking fluid, vaginal bleeding, contractions, decreased fetal movement. Denies headaches, visual disturbances, or epigastric pain.    Pregnancy complications include:   Patient Active Problem List   Diagnosis    Subchorionic hemorrhage    At high risk for complications of intrauterine pregnancy (IUP)    Maternal care for  "cervical incompetence in second trimester    History of cerclage, currently pregnant, second trimester    Rh negative status during pregnancy in second trimester    Previous  section x3 complicating pregnancy    Pregnancy complicated by cervical shortening in second trimester        No changes to medical, surgical, family, social, or obstetric history.    Medications:  Current Outpatient Medications   Medication Instructions    aspirin 81 mg, Oral, Daily    polycarbophil (FIBERCON) 625 mg, Oral, Daily    prenatal vit/iron fum/folic ac (PRENATAL 1+1 ORAL) Oral    progesterone (PROMETRIUM) 200 mg, Vaginal, Nightly       Review of Systems   12 point review of systems conducted, negative except as stated in the history of present illness. See HPI for details.      Objective     Visit Vitals  /79 (BP Location: Left arm, Patient Position: Sitting, BP Method: Large (Automatic))   Pulse 74   Ht 5' 4" (1.626 m)   Wt 95.7 kg (211 lb)   LMP 10/16/2023 (Approximate)   BMI 36.22 kg/m²        Physical Exam  Vitals and nursing note reviewed.   Constitutional:       Appearance: Normal appearance.      Comments: Increased BMI   HENT:      Head: Normocephalic and atraumatic.      Nose: Nose normal. No congestion.   Cardiovascular:      Rate and Rhythm: Normal rate.   Pulmonary:      Effort: Pulmonary effort is normal.   Skin:     Findings: No rash.   Neurological:      Mental Status: She is alert and oriented to person, place, and time.   Psychiatric:         Mood and Affect: Mood normal.         Behavior: Behavior normal.         Thought Content: Thought content normal.         Judgment: Judgment normal.           ASSESSMENT/PLAN:     33 y.o.  female with IUP at 16w6d    Subchorionic hemorrhage with history of vaginal bleeding  Subchorionic hemorrhage measuring 2.4 x 0.6 cm at consult visit.    She was advised that most patients with this do well in pregnancy. However, there is higher association of bleeding in " later pregnancy as well as pregnancy loss (if less than 24 weeks) PPROM,  labor and delivery, especially in patient with bleeding episodes rather than asymptomatic subchorionic hemorrhage.  There is no prevention available and advised of need for pelvic rest x 2 weeks from last bleeding episode with expectant management will be done and advised to report any bleeding or increased cramps mainly in the second half of the pregnancy.      History of  delivery at 26 weeks in previous pregnancy with history of cerclage x2, s/p cerclage  She is aware of risks associated with cerclage placement and that goal is to decrease risk of  labor and delivery. She is to continue limited activity/home rest with ambulation/exercising BLE for DVT prophylaxis, no tub baths, and pelvic rest. She was instructed to report immediately any abdominal cramping, vaginal bleeding or change in discharge.     Transvaginal ultrasound today showed a closed relatively short cervix measuring 2.7 cm with cervical cerclage suture in place.      With cervical length less than 3 cm, discussed benefits and risks of vaginal progesterone supplementation and agreed to start vaginal progesterone 200 mg nightly.  She will continue until 36 weeks 6 days.    Will plan for cerclage removal at time of repeat , unless indicated earlier.  PTL precautions reviewed.       History of 3 previous C-sections  She is aware of risk with previous  section, including improper placental implantation and abnormalities such as accreta, placental abruption and risks of uterine rupture with labor with need for emergency  section with  morbidity/mortality associated with that. She is to report any significant cramping or vaginal bleeding.       With repeat cesareans, standard of care is to deliver at 39 weeks, if there are no other obstetric risk factor for earlier delivery. With multiple  sections, there is greater  potential for placental abruption, uterine rupture and improper placental implantation not detected antenatally requiring emergency delivery, if labor ensues. Although standard of care does not endorse delivery prior to 39 weeks, there is some data suggesting worsening  outcomes with delivery after 38 weeks. It may be reasonable and beneficial to deliver at 38 -39 weeks, understanding risks for issues of prematurity and need for NICU admission in this setting.    With history of multiple c-sections, previously advised against future pregnancy with risks as above.        Increased BMI in pregnancy  Body mass index is 36.22 kg/m². With borderline excessive gain, 3 lb gain since last visit, she was advised to decrease caloric intake and was reminded of the importance of avoiding excessive weight gain.  Excess weight gain would be associated with gestational hypertension, gestational diabetes and adverse  outcomes, including fetal demise in utero.    With risk factors associated with increased BMI, she is to do fetal kick counts throughout the pregnancy (after 24 weeks).    It is important to lose weight after the pregnancy is over, especially before a future pregnancy was discussed. Breastfeeding may be an important tool in reducing the postpartum weight retention. Fetal risks were discussed with short term risk of fetal/ obesity and long term risk of adolescent component of metabolic syndrome.       At high risk for preeclampsia  With her increased risk for preeclampsia, she agreed to continue asa 81 mg daily until delivery.. Preeclampsia precautions reviewed.       Follow up in about 4 weeks (around 2024) for [facility] follow-up, Level 2 scan.     No future appointments.       Patient has no complaints.  She is status post cerclage.  To assess need for vaginal progesterone, patient underwent a transvaginal ultrasound showed relatively short cervix at 2.7 cm.  Discussed the uncertainty this  situation with benefits and potential risks and agreed to use vaginal progesterone 200 mg q.h.s..  We will plan to see her again in 4 weeks to do a fetal anatomy scan.  Importance of healthy eating and avoiding excess weight gain discussed.    DESMOND involvement: Patient was evaluated by SHARI Monroe and Dr. Russo.  Final assessment and recommendations as stated above were made by Dr. Russo.    This note was created with the assistance of [employer] voice recognition software. There may be transcription errors as a result of using this technology, however minimal. Effort has been made to ensure accuracy of transcription, but any obvious errors or omissions should be clarified with the author of the document.

## 2024-02-26 DIAGNOSIS — Z36.89 ENCOUNTER FOR FETAL ANATOMIC SURVEY: ICD-10-CM

## 2024-02-26 DIAGNOSIS — O34.219 PREVIOUS CESAREAN SECTION COMPLICATING PREGNANCY: Primary | ICD-10-CM

## 2024-02-26 DIAGNOSIS — O34.32 MATERNAL CARE FOR CERVICAL INCOMPETENCE IN SECOND TRIMESTER: ICD-10-CM

## 2024-02-26 DIAGNOSIS — O99.212 OBESITY AFFECTING PREGNANCY IN SECOND TRIMESTER, UNSPECIFIED OBESITY TYPE: ICD-10-CM

## 2024-02-28 ENCOUNTER — PROCEDURE VISIT (OUTPATIENT)
Dept: MATERNAL FETAL MEDICINE | Facility: CLINIC | Age: 34
End: 2024-02-28
Payer: MEDICAID

## 2024-02-28 ENCOUNTER — OFFICE VISIT (OUTPATIENT)
Dept: MATERNAL FETAL MEDICINE | Facility: CLINIC | Age: 34
End: 2024-02-28
Payer: MEDICAID

## 2024-02-28 VITALS
HEIGHT: 64 IN | SYSTOLIC BLOOD PRESSURE: 112 MMHG | DIASTOLIC BLOOD PRESSURE: 72 MMHG | BODY MASS INDEX: 36.3 KG/M2 | HEART RATE: 67 BPM | WEIGHT: 212.63 LBS

## 2024-02-28 DIAGNOSIS — O34.219 PREVIOUS CESAREAN SECTION COMPLICATING PREGNANCY: ICD-10-CM

## 2024-02-28 DIAGNOSIS — O34.32 MATERNAL CARE FOR CERVICAL INCOMPETENCE IN SECOND TRIMESTER: ICD-10-CM

## 2024-02-28 DIAGNOSIS — O41.8X20 SUBCHORIONIC HEMORRHAGE OF PLACENTA IN SECOND TRIMESTER, SINGLE OR UNSPECIFIED FETUS: ICD-10-CM

## 2024-02-28 DIAGNOSIS — O99.212 OBESITY AFFECTING PREGNANCY IN SECOND TRIMESTER, UNSPECIFIED OBESITY TYPE: ICD-10-CM

## 2024-02-28 DIAGNOSIS — E66.01 MORBID OBESITY: ICD-10-CM

## 2024-02-28 DIAGNOSIS — O26.892 RH NEGATIVE STATUS DURING PREGNANCY IN SECOND TRIMESTER: ICD-10-CM

## 2024-02-28 DIAGNOSIS — O09.90 AT HIGH RISK FOR COMPLICATIONS OF INTRAUTERINE PREGNANCY (IUP): Primary | ICD-10-CM

## 2024-02-28 DIAGNOSIS — Z98.890 HISTORY OF CERCLAGE, CURRENTLY PREGNANT, SECOND TRIMESTER: ICD-10-CM

## 2024-02-28 DIAGNOSIS — O09.292 HISTORY OF CERCLAGE, CURRENTLY PREGNANT, SECOND TRIMESTER: ICD-10-CM

## 2024-02-28 DIAGNOSIS — Z67.91 RH NEGATIVE STATUS DURING PREGNANCY IN SECOND TRIMESTER: ICD-10-CM

## 2024-02-28 DIAGNOSIS — O46.8X2 SUBCHORIONIC HEMORRHAGE OF PLACENTA IN SECOND TRIMESTER, SINGLE OR UNSPECIFIED FETUS: ICD-10-CM

## 2024-02-28 DIAGNOSIS — Z36.89 ENCOUNTER FOR FETAL ANATOMIC SURVEY: ICD-10-CM

## 2024-02-28 DIAGNOSIS — O26.872 PREGNANCY COMPLICATED BY CERVICAL SHORTENING IN SECOND TRIMESTER: ICD-10-CM

## 2024-02-28 PROCEDURE — 3078F DIAST BP <80 MM HG: CPT | Mod: CPTII,S$GLB,, | Performed by: OBSTETRICS & GYNECOLOGY

## 2024-02-28 PROCEDURE — 76811 OB US DETAILED SNGL FETUS: CPT | Mod: S$GLB,,, | Performed by: OBSTETRICS & GYNECOLOGY

## 2024-02-28 PROCEDURE — 3008F BODY MASS INDEX DOCD: CPT | Mod: CPTII,S$GLB,, | Performed by: OBSTETRICS & GYNECOLOGY

## 2024-02-28 PROCEDURE — 1159F MED LIST DOCD IN RCRD: CPT | Mod: CPTII,S$GLB,, | Performed by: OBSTETRICS & GYNECOLOGY

## 2024-02-28 PROCEDURE — 1160F RVW MEDS BY RX/DR IN RCRD: CPT | Mod: CPTII,S$GLB,, | Performed by: OBSTETRICS & GYNECOLOGY

## 2024-02-28 PROCEDURE — 99213 OFFICE O/P EST LOW 20 MIN: CPT | Mod: 25,TH,S$GLB, | Performed by: OBSTETRICS & GYNECOLOGY

## 2024-02-28 PROCEDURE — 3074F SYST BP LT 130 MM HG: CPT | Mod: CPTII,S$GLB,, | Performed by: OBSTETRICS & GYNECOLOGY

## 2024-02-28 NOTE — PROGRESS NOTES
[facility]  Maternal Fetal Medicine Follow Up    Subjective     Patient ID: [id number]    Chief Complaint: M Follow up with US      HPI: Tori Bojorquez is a 33 y.o. female  at 20w5d gestation with Estimated Date of Delivery: 24  who is here for follow  up consultation by M.    She had subchorionic hemorrhage noted on outside ultrasound.  She has had vaginal bleeding since  that started similar to a period.  She reports her bleeding is now resolved, has not had any bleeding since mid December. She is Rh negative and received RhoGAM on 23.  She has had 3 previous C-sections.  Her last  was 9 months ago.  She has history of  delivery at 27 weeks in her 1st pregnancy.  She reports she had she reports she had no symptoms prior to delivery and states that baby started coming out when she went to the restroom.  When she went to the hospital they tried to place her in Trendelenburg and delay labor, but she ended up needing an emergency . She has history of cervical incompetence with cerclage placed early in the 2nd pregnancy.  She also received weekly Abigail injections in that pregnancy and delivered at 38 weeks.  She reported that she had cervical surveillance last pregnancy with OB in [place], and underwent ultrasound indicated cerclage at 18 weeks, and was on nightly vaginal progesterone with delivery at 38 weeks.  She is s/p prophylactic cerclage on 1/10/2024.  She had relatively short cervix noted on 2024 at 2.7 cm and was started on nightly vaginal progesterone.  She has increased BMI 35.5 on initial MFM consultation visit.  She is on prophylactic low-dose aspirin daily.       Interval history since last M visit: None.. She denies any leaking fluid, vaginal bleeding, contractions, decreased fetal movement. Denies headaches, visual disturbances, or epigastric pain.    Pregnancy complications include:   Patient Active Problem List   Diagnosis    Subchorionic  "hemorrhage    At high risk for complications of intrauterine pregnancy (IUP)    Maternal care for cervical incompetence in second trimester    History of cerclage, currently pregnant, second trimester    Rh negative status during pregnancy in second trimester    Previous  section x3 complicating pregnancy    Pregnancy complicated by cervical shortening in second trimester    Increased BMI affecting pregnancy in second trimester    BMI 35.5 at initial [facility] consultation visit        No changes to medical, surgical, family, social, or obstetric history.    Medications:  Current Outpatient Medications   Medication Instructions    aspirin 81 mg, Oral, Daily    polycarbophil (FIBERCON) 625 mg, Oral, Daily    prenatal vit/iron fum/folic ac (PRENATAL 1+1 ORAL) Oral    progesterone (PROMETRIUM) 200 mg, Vaginal, Nightly       Review of Systems   12 point review of systems conducted, negative except as stated in the history of present illness. See HPI for details.      Objective     Visit Vitals  /72   Pulse 67   Ht 5' 4" (1.626 m)   Wt 96.4 kg (212 lb 9.6 oz)   LMP 10/16/2023 (Approximate)   BMI 36.49 kg/m²        Physical Exam  Vitals and nursing note reviewed.   Constitutional:       Appearance: Normal appearance.      Comments: Increased BMI   HENT:      Head: Normocephalic and atraumatic.      Nose: Nose normal. No congestion.   Cardiovascular:      Rate and Rhythm: Normal rate.   Pulmonary:      Effort: Pulmonary effort is normal.   Skin:     Findings: No rash.   Neurological:      Mental Status: She is alert and oriented to person, place, and time.   Psychiatric:         Mood and Affect: Mood normal.         Behavior: Behavior normal.         Thought Content: Thought content normal.         Judgment: Judgment normal.           ASSESSMENT/PLAN:     33 y.o.  female with IUP at 20w5d    Subchorionic hemorrhage with previous vaginal bleeding  There is normal fetal growth and no anomalies seen on fetal " anatomy scan.  AFV is normal.     Subchorionic hemorrhage measuring 2.4 x 0.6 cm at consult visit.    She was advised that most patients with this do well in pregnancy. However, there is higher association of bleeding in later pregnancy as well as pregnancy loss (if less than 24 weeks) PPROM,  labor and delivery, especially in patient with bleeding episodes rather than asymptomatic subchorionic hemorrhage.  There is no prevention available and advised of need for pelvic rest x 2 weeks from last bleeding episode with expectant management will be done and advised to report any bleeding or increased cramps mainly in the second half of the pregnancy.      History of  delivery at 26 weeks in previous pregnancy with history of cerclage x2, s/p cerclage  She is aware of risks associated with cerclage placement and that goal is to decrease risk of  labor and delivery. She is to continue limited activity/home rest with ambulation/exercising BLE for DVT prophylaxis, no tub baths, and pelvic rest and continue vaginal progesterone suppositories every night. She was instructed to report immediately any abdominal cramping, vaginal bleeding or change in discharge.     Transvaginal ultrasound on 2024 showed a closed relatively short cervix measuring 2.7 cm with cervical cerclage suture in place.      She will continue until vaginal progesterone nightly until 36 weeks 6 days.    Will plan for cerclage removal at time of repeat , unless indicated earlier.  PTL precautions reviewed.       History of 3 previous C-sections  She is aware of risk with previous  section, including improper placental implantation and abnormalities such as accreta, placental abruption and risks of uterine rupture with labor with need for emergency  section with  morbidity/mortality associated with that. She is to report any significant cramping or vaginal bleeding.       With repeat cesareans,  standard of care is to deliver at 39 weeks, if there are no other obstetric risk factor for earlier delivery. With multiple  sections, there is greater potential for placental abruption, uterine rupture and improper placental implantation not detected antenatally requiring emergency delivery, if labor ensues. Although standard of care does not endorse delivery prior to 39 weeks, there is some data suggesting worsening  outcomes with delivery after 38 weeks. It may be reasonable and beneficial to deliver at 38 -39 weeks, understanding risks for issues of prematurity and need for NICU admission in this setting.    With history of multiple c-sections, previously advised against future pregnancy with risks as above.        Increased BMI in pregnancy  Body mass index is 36.49 kg/m².  With reasonable weight gain of 4 lb in about 6 weeks.  Patient was encouraged to continue eating healthy and limiting weight gain to guideline recommendations  Excess weight gain would be associated with gestational hypertension, gestational diabetes and adverse  outcomes, including fetal demise in utero.    With risk factors associated with increased BMI, she is to do fetal kick counts throughout the pregnancy (after 24 weeks).    It is important to lose weight after the pregnancy is over, especially before a future pregnancy was discussed. Breastfeeding may be an important tool in reducing the postpartum weight retention. Fetal risks were discussed with short term risk of fetal/ obesity and long term risk of adolescent component of metabolic syndrome.       At high risk for preeclampsia  With her increased risk for preeclampsia, she agreed to continue asa 81 mg daily until delivery.. Preeclampsia precautions reviewed.       We would BMI over 35, we will plan to see her at 36-,1/2 weeks and start weekly testing.  Fetal kick count instructions.  Preeclampsia warnings.  We will see her sooner at any time at  Dr. Sanchez's discretion.    Follow up in about 16 weeks (around 6/19/2024) for MFM follow-up, Repeat ultrasound, BPP.     Future Appointments   Date Time Provider Department Center   6/19/2024  2:00 PM Sanket Russo MD [facility]   6/19/2024  2:00 PM ROOM 1 AND 2, [facility]       DESMOND involvement: Patient was evaluated and examined by Dr. Russo. SHARI Monroe, helped in pre charting of part of note.    This note was created with the assistance of [employer] voice recognition software. There may be transcription errors as a result of using this technology, however minimal. Effort has been made to ensure accuracy of transcription, but any obvious errors or omissions should be clarified with the author of the document.

## 2024-06-03 ENCOUNTER — HOSPITAL ENCOUNTER (OUTPATIENT)
Facility: HOSPITAL | Age: 34
Discharge: HOME | End: 2024-06-03
Attending: OBSTETRICS & GYNECOLOGY | Admitting: OBSTETRICS & GYNECOLOGY
Payer: MEDICAID

## 2024-06-03 VITALS
RESPIRATION RATE: 17 BRPM | TEMPERATURE: 98 F | OXYGEN SATURATION: 97 % | SYSTOLIC BLOOD PRESSURE: 117 MMHG | HEART RATE: 88 BPM | BODY MASS INDEX: 36.49 KG/M2 | HEIGHT: 64 IN | DIASTOLIC BLOOD PRESSURE: 69 MMHG

## 2024-06-03 DIAGNOSIS — O41.00X0 OLIGOHYDRAMNIOS: ICD-10-CM

## 2024-06-03 LAB — STREP B PCR (OHS): NOT DETECTED

## 2024-06-03 PROCEDURE — 59025 FETAL NON-STRESS TEST: CPT

## 2024-06-07 ENCOUNTER — TELEPHONE (OUTPATIENT)
Dept: MATERNAL FETAL MEDICINE | Facility: CLINIC | Age: 34
End: 2024-06-07
Payer: MEDICAID

## 2024-06-07 ENCOUNTER — HOSPITAL ENCOUNTER (EMERGENCY)
Facility: HOSPITAL | Age: 34
Discharge: HOME | End: 2024-06-07
Payer: MEDICAID

## 2024-06-07 VITALS
DIASTOLIC BLOOD PRESSURE: 59 MMHG | RESPIRATION RATE: 18 BRPM | OXYGEN SATURATION: 97 % | SYSTOLIC BLOOD PRESSURE: 107 MMHG | HEART RATE: 90 BPM | TEMPERATURE: 98 F

## 2024-06-07 PROCEDURE — 99284 EMERGENCY DEPT VISIT MOD MDM: CPT

## 2024-06-07 NOTE — ED PROVIDER NOTES
AUDI NOTE     Admit Date: 2024  AUDI Physician: Mita Cornelius, NP  Primary OBGYN: Dr. Sanchez    Admit Diagnosis/Chief Complaint: Pelvic pain      Chief Complaint   Patient presents with    vaginal pressure     IUP 35.0w c/o vaginal pressure, pt states feels like the baby is going to rip the [facility]       Hospital Course:  Tori Bojorquez is a 33 y.o.  at 35w0d presents complaining of pelvic pain/vaginal pressure starting today. Pt with cerclage  per Dr. Russo, history of  x 3 states feels baby grinding in pelvis. She denies contractions, vaginal bleeding, leaking fluid, decrease fetal movement.     Patient states has been complicated by history of PTD (26wks), history of c-sections x 3 in this pregnancy.     Patient denies vaginal bleeding, leakage of fluid, contractions, headache, vision changes, RUQ pain, dysuria, fever, and nausea/vomiting.  Fetal Movement: normal.    /65   Pulse 88   Temp 98.2 °F (36.8 °C)   Resp 18   LMP 10/16/2023 (Approximate)   SpO2 98%   Temp:  [98.2 °F (36.8 °C)] 98.2 °F (36.8 °C)  Pulse:  [86-88] 88  Resp:  [18] 18  SpO2:  [98 %] 98 %  BP: (118)/(65) 118/65    General: in no apparent distress well developed and well nourished non-toxic in no respiratory distress and acyanotic alert oriented times 3 afebrile normal vitals cooperative  Abdominal: soft, nontender, nondistended, no abnormal masses, no epigastric pain FHT present  Back: lumbar tenderness absent   CVA tenderness none  Extremeties no redness or tenderness in the calves or thighs no edema  SSE visually no active or old blood and normal leukorrhea, cervix visually closed/thick. Cerclage intact.   SSE:   SVE:      FHT:  Reactive Cat 1  TOCO: Contractions  10-20 mins      LABS:   No results found for this or any previous visit (from the past 24 hour(s)).  [unfilled]     Imaging Results    None          ASSESMENT: Tori Bojorquez is a 33 y.o.   at 35w0d with pelvic pain, cerclage in  place  Observation in AUDI  Rule out labor  We will reevaluate cervix if neeraj  If no contractions or cervical change, will discharge from hospital  Labor precautions reviewed with patient  ER precautions reviewed with patient  Patient was given an opportunity to ask questions  She is to follow-up with her primary care physician        Discharge Diagnosis/Clinical Impression**: Rule Out Labor, Contractions in Pregnancy, False Labor, cerclage in place, 35 weeks  Status:Stable    Patient Instructions:       - Pt was given routine pregnancy instructions including to return to triage if she had any vaginal bleeding (other than spotting for the next 48hrs), any loss of fluid like her water broke, decreased fetal movement, or contractions Q 5min lasting for 2 or more hours. Pt was also instructed to drink copious water. Patient voiced understanding of all these instructions and was subsequently discharged home.    She will follow up with her primary OB.

## 2024-06-08 ENCOUNTER — HOSPITAL ENCOUNTER (EMERGENCY)
Facility: HOSPITAL | Age: 34
Discharge: HOME | End: 2024-06-08
Payer: MEDICAID

## 2024-06-08 VITALS
RESPIRATION RATE: 18 BRPM | OXYGEN SATURATION: 100 % | HEART RATE: 78 BPM | TEMPERATURE: 99 F | SYSTOLIC BLOOD PRESSURE: 99 MMHG | DIASTOLIC BLOOD PRESSURE: 68 MMHG

## 2024-06-08 DIAGNOSIS — O34.30 CERVICAL CERCLAGE SUTURE PRESENT: ICD-10-CM

## 2024-06-08 LAB
CTP QC/QA: YES
RUPTURE OF MEMBRANE: NEGATIVE

## 2024-06-08 PROCEDURE — 84112 EVAL AMNIOTIC FLUID PROTEIN: CPT

## 2024-06-08 PROCEDURE — 99284 EMERGENCY DEPT VISIT MOD MDM: CPT | Mod: 25

## 2024-06-09 NOTE — DISCHARGE INSTRUCTIONS
return to hospital for bright red vaginal bleeding, regular contractions, you think your water broke, or not feeling baby move.

## 2024-06-09 NOTE — ED PROVIDER NOTES
AUDI NOTE     Admit Date: 2024  AUDI Physician: Rodriguez Gross  Primary OBGYN: Dr. Sanchez    Admit Diagnosis/Chief Complaint: Leakage of Fluid      Chief Complaint   Patient presents with    Rupture of Membranes     Iup at 35.1 with c/o leaking of fluid. Ob is [facility] Course:  Tori Bojorquez is a 33 y.o.  at 35w1d presents complaining of Leaking of Fluid      Patient states has been complicated by C/Sx3, cerclage in this pregnancy.     Patient denies vaginal bleeding, headache, vision changes, RUQ pain, dysuria, fever, and nausea/vomiting.  Fetal Movement: normal.    Temp 99.1 °F (37.3 °C) (Oral)   Resp 18   LMP 10/16/2023 (Approximate)   Breastfeeding No   Temp:  [99.1 °F (37.3 °C)] 99.1 °F (37.3 °C)  Resp:  [18] 18    General: in no apparent distress well developed and well nourished non-toxic in no respiratory distress and acyanotic alert  Abdominal: soft, nontender, nondistended, no abnormal masses, no epigastric pain FHT present  Back: lumbar tenderness absent   CVA tenderness none  Extremeties no redness or tenderness in the calves or thighs no edema    SSE:   SVE:        ROM PLUS negative    FHT:  Reactive  TOCO: Contractions irregular patient comfortable      LABS:   No results found for this or any previous visit (from the past 24 hour(s)).  [unfilled]     Imaging Results    None          ASSESMENT: Tori Bojorquez is a 33 y.o.   at 35w1d rule out rupture of membranes  Observation in AUDI  Low suspicion of rupture of membranes with Rom + (-) and PLACIDO 14  Labor precautions reviewed with patient  ER precautions reviewed with patient  Patient was given an opportunity to ask questions  Patient is to follow-up with her primary care physician    I personally check ceclage and cervix is thick and closed, no significant pressure on cerclage.  Strict precautions given.  Has appt with Daniel on Monday        Discharge Diagnosis/Clinical Impression**: Rule out  Spontaneous Rupture Of Membranes    Status:Stable    Patient Instructions:       - Pt was given routine pregnancy instructions including to return to triage if she had any vaginal bleeding (other than spotting for the next 48hrs), any loss of fluid like her water broke, decreased fetal movement, or contractions Q 5min lasting for 2 or more hours. Pt was also instructed to drink copious water. Patient voiced understanding of all these instructions and was subsequently discharged home.    She will follow up with her primary OB.        This note was created with the assistance of [employer] voice recognition software. There may be transcription errors as a result of using this technology however minimal. Effort has been made to assure accuracy of transcription but any obvious errors or omissions should be clarified with the author of the document.

## 2024-06-10 ENCOUNTER — HOSPITAL ENCOUNTER (OUTPATIENT)
Facility: HOSPITAL | Age: 34
Discharge: LEFT BEFORE DISCHARGE | End: 2024-06-10
Attending: OBSTETRICS & GYNECOLOGY | Admitting: OBSTETRICS & GYNECOLOGY
Payer: MEDICAID

## 2024-06-10 VITALS
HEART RATE: 71 BPM | SYSTOLIC BLOOD PRESSURE: 103 MMHG | TEMPERATURE: 98 F | BODY MASS INDEX: 36.49 KG/M2 | HEIGHT: 64 IN | DIASTOLIC BLOOD PRESSURE: 51 MMHG | RESPIRATION RATE: 18 BRPM

## 2024-06-10 DIAGNOSIS — Z98.891 PREVIOUS CESAREAN SECTION: ICD-10-CM

## 2024-06-10 LAB
AMORPH URATE CRY URNS QL MICRO: ABNORMAL /UL
BACTERIA #/AREA URNS AUTO: ABNORMAL /HPF
BASOPHILS # BLD AUTO: 0.02 X10(3)/MCL
BASOPHILS NFR BLD AUTO: 0.2 %
BILIRUB UR QL STRIP.AUTO: NEGATIVE
CASTS URNS MICRO: ABNORMAL /LPF
CLARITY UR: ABNORMAL
COLOR UR AUTO: YELLOW
EOSINOPHIL # BLD AUTO: 0.09 X10(3)/MCL (ref 0–0.9)
EOSINOPHIL NFR BLD AUTO: 0.8 %
ERYTHROCYTE [DISTWIDTH] IN BLOOD BY AUTOMATED COUNT: 13.2 % (ref 11.5–17)
GLUCOSE UR QL STRIP: NORMAL
GROUP & RH: NORMAL
HCT VFR BLD AUTO: 36 % (ref 37–47)
HGB BLD-MCNC: 11.8 G/DL (ref 12–16)
HGB UR QL STRIP: NEGATIVE
IMM GRANULOCYTES # BLD AUTO: 0.13 X10(3)/MCL (ref 0–0.04)
IMM GRANULOCYTES NFR BLD AUTO: 1.2 %
INDIRECT COOMBS: NORMAL
KETONES UR QL STRIP: NEGATIVE
LEUKOCYTE ESTERASE UR QL STRIP: 500
LYMPHOCYTES # BLD AUTO: 2.41 X10(3)/MCL (ref 0.6–4.6)
LYMPHOCYTES NFR BLD AUTO: 21.9 %
MCH RBC QN AUTO: 29.4 PG (ref 27–31)
MCHC RBC AUTO-ENTMCNC: 32.8 G/DL (ref 33–36)
MCV RBC AUTO: 89.8 FL (ref 80–94)
MONOCYTES # BLD AUTO: 0.54 X10(3)/MCL (ref 0.1–1.3)
MONOCYTES NFR BLD AUTO: 4.9 %
MUCOUS THREADS URNS QL MICRO: ABNORMAL /LPF
NEUTROPHILS # BLD AUTO: 7.82 X10(3)/MCL (ref 2.1–9.2)
NEUTROPHILS NFR BLD AUTO: 71 %
NITRITE UR QL STRIP: NEGATIVE
NRBC BLD AUTO-RTO: 0 %
PH UR STRIP: 6.5 [PH]
PLATELET # BLD AUTO: 157 X10(3)/MCL (ref 130–400)
PMV BLD AUTO: 9.3 FL (ref 7.4–10.4)
PROT UR QL STRIP: NEGATIVE
RBC # BLD AUTO: 4.01 X10(6)/MCL (ref 4.2–5.4)
RBC #/AREA URNS AUTO: ABNORMAL /HPF
SP GR UR STRIP.AUTO: 1.01 (ref 1–1.03)
SPECIMEN OUTDATE: NORMAL
SQUAMOUS #/AREA URNS LPF: ABNORMAL /HPF
UROBILINOGEN UR STRIP-ACNC: NORMAL
WBC # SPEC AUTO: 11.01 X10(3)/MCL (ref 4.5–11.5)
WBC #/AREA URNS AUTO: ABNORMAL /HPF

## 2024-06-10 PROCEDURE — G0378 HOSPITAL OBSERVATION PER HR: HCPCS

## 2024-06-10 PROCEDURE — 86850 RBC ANTIBODY SCREEN: CPT | Performed by: OBSTETRICS & GYNECOLOGY

## 2024-06-10 PROCEDURE — 85025 COMPLETE CBC W/AUTO DIFF WBC: CPT | Performed by: OBSTETRICS & GYNECOLOGY

## 2024-06-10 PROCEDURE — 25000003 PHARM REV CODE 250: Performed by: OBSTETRICS & GYNECOLOGY

## 2024-06-10 PROCEDURE — G0379 DIRECT REFER HOSPITAL OBSERV: HCPCS

## 2024-06-10 PROCEDURE — 63600175 PHARM REV CODE 636 W HCPCS: Performed by: OBSTETRICS & GYNECOLOGY

## 2024-06-10 PROCEDURE — 81001 URINALYSIS AUTO W/SCOPE: CPT | Performed by: OBSTETRICS & GYNECOLOGY

## 2024-06-10 PROCEDURE — 59025 FETAL NON-STRESS TEST: CPT

## 2024-06-10 RX ORDER — DEXTROSE, SODIUM CHLORIDE, SODIUM LACTATE, POTASSIUM CHLORIDE, AND CALCIUM CHLORIDE 5; .6; .31; .03; .02 G/100ML; G/100ML; G/100ML; G/100ML; G/100ML
INJECTION, SOLUTION INTRAVENOUS ONCE
Status: COMPLETED | OUTPATIENT
Start: 2024-06-10 | End: 2024-06-10

## 2024-06-10 RX ORDER — DEXTROSE, SODIUM CHLORIDE, SODIUM LACTATE, POTASSIUM CHLORIDE, AND CALCIUM CHLORIDE 5; .6; .31; .03; .02 G/100ML; G/100ML; G/100ML; G/100ML; G/100ML
INJECTION, SOLUTION INTRAVENOUS CONTINUOUS
Status: DISCONTINUED | OUTPATIENT
Start: 2024-06-10 | End: 2024-06-10 | Stop reason: HOSPADM

## 2024-06-10 RX ADMIN — SODIUM CHLORIDE, SODIUM LACTATE, POTASSIUM CHLORIDE, CALCIUM CHLORIDE AND DEXTROSE MONOHYDRATE: 5; 600; 310; 30; 20 INJECTION, SOLUTION INTRAVENOUS at 02:06

## 2024-06-10 RX ADMIN — CEFTRIAXONE SODIUM 1 G: 1 INJECTION, POWDER, FOR SOLUTION INTRAMUSCULAR; INTRAVENOUS at 03:06

## 2024-06-10 RX ADMIN — SODIUM CHLORIDE, SODIUM LACTATE, POTASSIUM CHLORIDE, CALCIUM CHLORIDE AND DEXTROSE MONOHYDRATE: 5; 600; 310; 30; 20 INJECTION, SOLUTION INTRAVENOUS at 03:06

## 2024-06-10 NOTE — PROGRESS NOTES
06/10/24 1740   TeleStork Matt Note - Strip   Strip Reviewed by Matt Nurse? Yes   TeleStork Matt Note - Communication   [place] Nurse Communicated with Bedside Nurse Regarding: Fetal Status   TeleStork Matt Note - Notification   Nurse Notified? Yes   Name of Nurse josefina Ballard nurse at bedside

## 2024-06-11 PROBLEM — O34.33 MATERNAL CARE FOR CERVICAL INCOMPETENCE IN THIRD TRIMESTER: Status: ACTIVE | Noted: 2023-12-14

## 2024-06-11 PROBLEM — O99.213 OBESITY AFFECTING PREGNANCY IN THIRD TRIMESTER: Status: ACTIVE | Noted: 2024-02-01

## 2024-06-11 PROBLEM — O09.293 HISTORY OF CERCLAGE, CURRENTLY PREGNANT, THIRD TRIMESTER: Status: ACTIVE | Noted: 2023-12-14

## 2024-06-11 PROBLEM — O46.8X9 SUBCHORIONIC HEMORRHAGE: Status: RESOLVED | Noted: 2023-12-14 | Resolved: 2024-06-11

## 2024-06-11 PROBLEM — O26.893 RH NEGATIVE STATUS DURING PREGNANCY IN THIRD TRIMESTER: Status: ACTIVE | Noted: 2023-12-14

## 2024-06-11 PROBLEM — O26.873: Status: ACTIVE | Noted: 2024-02-01

## 2024-06-11 NOTE — PROGRESS NOTES
Maternal Fetal Medicine Follow Up    Subjective     Patient ID: [id number]    Chief Complaint: mfm followup w/us      HPI: Tori Bojorquez is a 33 y.o. female  at 35w5d gestation with Estimated Date of Delivery: 24  who is here for follow  up consultation by M.    She has had 3 previous C-sections.  Her last  was on 3/2023.  She has history of  delivery at 27 weeks in her 1st pregnancy. She has history of cervical incompetence with cerclage placed early in the 2nd pregnancy.  She also received weekly Abigail injections in that pregnancy and delivered at 38 weeks.  She reported that she had cervical surveillance last pregnancy with OB in [place], and underwent ultrasound indicated cerclage at 18 weeks, and was on nightly vaginal progesterone with delivery at 38 weeks.  She is s/p prophylactic cerclage on 1/10/2024.  She had relatively short cervix noted on 2024 at 2.7 cm and was started on nightly vaginal progesterone.  She had increased BMI 35.5 on initial MFM consultation visit.  She is on prophylactic low-dose aspirin daily.       Interval history since last MFM visit: None.. She denies any leaking fluid, vaginal bleeding, contractions, decreased fetal movement. Denies headaches, visual disturbances, or epigastric pain.    Pregnancy complications include:   Patient Active Problem List   Diagnosis    At high risk for complications of intrauterine pregnancy (IUP)    Maternal care for cervical incompetence in third trimester    History of cerclage, currently pregnant, third trimester    Rh negative status during pregnancy in third trimester    Previous  section x3 complicating pregnancy    Pregnancy complicated by cervical shortening, s/p cerclage, in third trimester    Increased BMI affecting pregnancy in third trimester    BMI 35.5 at initial [facility] consultation visit        No changes to medical, surgical, family, social, or obstetric history.    Medications:  Current  "Outpatient Medications   Medication Instructions    aspirin 81 mg, Oral, Daily    nitrofurantoin, macrocrystal-monohydrate, (MACROBID) 100 MG capsule 100 mg, Oral, 2 times daily    polycarbophil (FIBERCON) 625 mg, Oral, Daily    prenatal vit/iron fum/folic ac (PRENATAL 1+1 ORAL) Oral    progesterone (PROMETRIUM) 200 mg, Vaginal, Nightly       Review of Systems   12 point review of systems conducted, negative except as stated in the history of present illness. See HPI for details.      Objective     Visit Vitals  /73 (BP Location: Left arm, Patient Position: Sitting, BP Method: X-Large (Automatic))   Pulse 82   Ht 5' 4" (1.626 m)   Wt 102.7 kg (226 lb 6.4 oz)   LMP 10/16/2023 (Approximate)   BMI 38.86 kg/m²        Physical Exam  Vitals and nursing note reviewed.   Constitutional:       Appearance: Normal appearance.      Comments: Increased BMI   HENT:      Head: Normocephalic and atraumatic.      Nose: Nose normal. No congestion.   Cardiovascular:      Rate and Rhythm: Normal rate.   Pulmonary:      Effort: Pulmonary effort is normal.   Skin:     Findings: No rash.   Neurological:      Mental Status: She is alert and oriented to person, place, and time.   Psychiatric:         Mood and Affect: Mood normal.         Behavior: Behavior normal.         Thought Content: Thought content normal.         Judgment: Judgment normal.         ASSESSMENT/PLAN:     33 y.o.  female with IUP at 35w5d    History of  delivery at 26 weeks in previous pregnancy with history of cerclage x2, s/p cerclage  There is upper normal fetal growth with an EFW of 2944 g at the 51% and the AC at the 95%  on 24.  AFV is normal. BPP is 8/8 today (2024).     She is aware of risks associated with cerclage placement and that goal is to decrease risk of  labor and delivery. She is to continue limited activity/home rest with ambulation/exercising BLE for DVT prophylaxis, no tub baths, and pelvic rest and continue " vaginal progesterone suppositories every night. She was instructed to report immediately any abdominal cramping, vaginal bleeding or change in discharge.     She will continue until vaginal progesterone nightly until 36 weeks 6 days.    Plan for cerclage removal at time of repeat , unless indicated earlier.  PTL precautions reviewed.       History of 3 previous C-sections  She is aware of risk with previous  section, including improper placental implantation and abnormalities such as accreta, placental abruption and risks of uterine rupture with labor with need for emergency  section with  morbidity/mortality associated with that. She is to report any significant cramping or vaginal bleeding.       With repeat cesareans, standard of care is to deliver at 39 weeks, if there are no other obstetric risk factor for earlier delivery. With multiple  sections, there is greater potential for placental abruption, uterine rupture and improper placental implantation not detected antenatally requiring emergency delivery, if labor ensues. Although standard of care does not endorse delivery prior to 39 weeks, there is some data suggesting worsening  outcomes with delivery after 38 weeks. It may be reasonable and beneficial to deliver at 38 -39 weeks, understanding risks for issues of prematurity and need for NICU admission in this setting.    With history of multiple c-sections, previously advised against future pregnancy with risks as above.        Increased BMI in pregnancy  Body mass index is 38.86 kg/m². With excessive weight gain from last visit, 14 lbs in 3 months , she was advised to decrease caloric intake and was reminded of the importance of avoiding excessive weight gain.  Excess weight gain would be associated with gestational hypertension, gestational diabetes and adverse  outcomes, including fetal demise in utero.    It is important to lose weight after the  pregnancy is over, especially before a future pregnancy. Breastfeeding may be an important tool in reducing the postpartum weight retention. Fetal risks were discussed with short term risk of fetal/ obesity and long term risk of adolescent component of metabolic syndrome.    With elevated BMI greater than 35, we will continue weekly fetal testing until delivery.  Reviewed importance of FKC 3/day and prn with instructions to immediately report any decreased fetal movement.      At high risk for preeclampsia  With her increased risk for preeclampsia, she agreed to continue asa 81 mg daily until delivery.. Preeclampsia precautions reviewed.     With a BMI over 35 we will do weekly testing till delivery.  Fetal kick count instructions.    Follow up in about 1 week (around 2024) for MFM Followup, BPP.     Future Appointments   Date Time Provider Department Center   2024 10:00 AM Sanket Russo MD [facility]   2024 10:00 AM ROOM 1, [facility]       DESMOND involvement: Patient was evaluated and examined by Dr. Russo. ALISHA Garcia, helped in pre charting of part of note.    This note was created with the assistance of [employer] voice recognition software. There may be transcription errors as a result of using this technology, however minimal. Effort has been made to ensure accuracy of transcription, but any obvious errors or omissions should be clarified with the author of the document.

## 2024-06-12 ENCOUNTER — OFFICE VISIT (OUTPATIENT)
Dept: MATERNAL FETAL MEDICINE | Facility: CLINIC | Age: 34
End: 2024-06-12
Payer: MEDICAID

## 2024-06-12 ENCOUNTER — PROCEDURE VISIT (OUTPATIENT)
Dept: MATERNAL FETAL MEDICINE | Facility: CLINIC | Age: 34
End: 2024-06-12
Payer: MEDICAID

## 2024-06-12 VITALS
DIASTOLIC BLOOD PRESSURE: 73 MMHG | WEIGHT: 226.38 LBS | HEIGHT: 64 IN | HEART RATE: 82 BPM | SYSTOLIC BLOOD PRESSURE: 106 MMHG | BODY MASS INDEX: 38.65 KG/M2

## 2024-06-12 DIAGNOSIS — O26.873 PREGNANCY COMPLICATED BY CERVICAL SHORTENING IN THIRD TRIMESTER: ICD-10-CM

## 2024-06-12 DIAGNOSIS — Z98.890 HISTORY OF CERCLAGE, CURRENTLY PREGNANT, THIRD TRIMESTER: ICD-10-CM

## 2024-06-12 DIAGNOSIS — O26.893 RH NEGATIVE STATUS DURING PREGNANCY IN THIRD TRIMESTER: ICD-10-CM

## 2024-06-12 DIAGNOSIS — O09.292 HISTORY OF CERCLAGE, CURRENTLY PREGNANT, SECOND TRIMESTER: ICD-10-CM

## 2024-06-12 DIAGNOSIS — O09.293 HISTORY OF CERCLAGE, CURRENTLY PREGNANT, THIRD TRIMESTER: ICD-10-CM

## 2024-06-12 DIAGNOSIS — E66.01 MORBID OBESITY: ICD-10-CM

## 2024-06-12 DIAGNOSIS — O34.219 PREVIOUS CESAREAN SECTION COMPLICATING PREGNANCY: ICD-10-CM

## 2024-06-12 DIAGNOSIS — Z98.890 HISTORY OF CERCLAGE, CURRENTLY PREGNANT, SECOND TRIMESTER: ICD-10-CM

## 2024-06-12 DIAGNOSIS — Z67.91 RH NEGATIVE STATUS DURING PREGNANCY IN THIRD TRIMESTER: ICD-10-CM

## 2024-06-12 DIAGNOSIS — O09.90 AT HIGH RISK FOR COMPLICATIONS OF INTRAUTERINE PREGNANCY (IUP): ICD-10-CM

## 2024-06-12 DIAGNOSIS — O09.90 AT HIGH RISK FOR COMPLICATIONS OF INTRAUTERINE PREGNANCY (IUP): Primary | ICD-10-CM

## 2024-06-12 DIAGNOSIS — O34.33 MATERNAL CARE FOR CERVICAL INCOMPETENCE IN THIRD TRIMESTER: ICD-10-CM

## 2024-06-12 DIAGNOSIS — O99.213 OBESITY AFFECTING PREGNANCY IN THIRD TRIMESTER, UNSPECIFIED OBESITY TYPE: ICD-10-CM

## 2024-06-12 PROCEDURE — 3008F BODY MASS INDEX DOCD: CPT | Mod: CPTII,S$GLB,, | Performed by: OBSTETRICS & GYNECOLOGY

## 2024-06-12 PROCEDURE — 1159F MED LIST DOCD IN RCRD: CPT | Mod: CPTII,S$GLB,, | Performed by: OBSTETRICS & GYNECOLOGY

## 2024-06-12 PROCEDURE — 1160F RVW MEDS BY RX/DR IN RCRD: CPT | Mod: CPTII,S$GLB,, | Performed by: OBSTETRICS & GYNECOLOGY

## 2024-06-12 PROCEDURE — 76819 FETAL BIOPHYS PROFIL W/O NST: CPT | Mod: S$GLB,,, | Performed by: OBSTETRICS & GYNECOLOGY

## 2024-06-12 PROCEDURE — 3078F DIAST BP <80 MM HG: CPT | Mod: CPTII,S$GLB,, | Performed by: OBSTETRICS & GYNECOLOGY

## 2024-06-12 PROCEDURE — 99213 OFFICE O/P EST LOW 20 MIN: CPT | Mod: TH,S$GLB,, | Performed by: OBSTETRICS & GYNECOLOGY

## 2024-06-12 PROCEDURE — 3074F SYST BP LT 130 MM HG: CPT | Mod: CPTII,S$GLB,, | Performed by: OBSTETRICS & GYNECOLOGY

## 2024-06-12 PROCEDURE — 76816 OB US FOLLOW-UP PER FETUS: CPT | Mod: S$GLB,,, | Performed by: OBSTETRICS & GYNECOLOGY

## 2024-06-12 RX ORDER — NITROFURANTOIN 25; 75 MG/1; MG/1
100 CAPSULE ORAL 2 TIMES DAILY
COMMUNITY
Start: 2024-06-11

## 2024-06-13 ENCOUNTER — HOSPITAL ENCOUNTER (EMERGENCY)
Facility: HOSPITAL | Age: 34
Discharge: HOME | End: 2024-06-13
Attending: SPECIALIST
Payer: MEDICAID

## 2024-06-13 VITALS
SYSTOLIC BLOOD PRESSURE: 121 MMHG | DIASTOLIC BLOOD PRESSURE: 75 MMHG | HEART RATE: 83 BPM | TEMPERATURE: 98 F | OXYGEN SATURATION: 98 % | RESPIRATION RATE: 18 BRPM

## 2024-06-13 DIAGNOSIS — O99.213 OBESITY AFFECTING PREGNANCY IN THIRD TRIMESTER, UNSPECIFIED OBESITY TYPE: Primary | ICD-10-CM

## 2024-06-13 PROCEDURE — 99284 EMERGENCY DEPT VISIT MOD MDM: CPT

## 2024-06-14 NOTE — DISCHARGE INSTRUCTIONS
Return to the hospital for regular and painful contractions every few minutes that persist for a few hours, decreased fetal movement, vaginal bleeding or leaking fluid.

## 2024-06-14 NOTE — ED PROVIDER NOTES
AUDI NOTE     Admit Date: 2024  AUDI Physician: Nav Hernandez  Primary OBGYN: Dr. Sanchez    Chief Complaint   Patient presents with    Abdominal Pain     Cramping and vaginal swelling, c/o incisional pain         Hospital Course:  Tori Bojorquez is a 33 y.o.  at 35w6d presents complaining of lower abdominal pelvic pain and vaginal pressure.  Patient was concerned that she may be dilating or going into labor.    This IUP is complicated by .  History of cerclage currently pregnant.  Cervical shortening.  Previous  x3.    Patient denies vaginal bleeding, leakage of fluid, contractions, headache, vision changes, RUQ pain, dysuria, fever, and nausea/vomiting.  Fetal Movement: normal.      /75   Pulse 83   Temp 98.4 °F (36.9 °C) (Oral)   Resp 18   LMP 10/16/2023 (Approximate)   SpO2 98%   Temp:  [98.4 °F (36.9 °C)] 98.4 °F (36.9 °C)  Pulse:  [78-99] 83  Resp:  [18] 18  SpO2:  [97 %-98 %] 98 %  BP: (121)/(75) 121/75    General: in no respiratory distress and acyanotic  Cardiovascular: regular rate and rhythm no murmurs  Respiratory: clear to auscultation, no wheezes, rales or rhonchi, symmetric air entry  Abdominal: fundus soft, nontender except suprapubic area consistent with fetal presenting part.  Back: lumbar tenderness present CVA tenderness none  Extremeties no redness or tenderness in the calves or thighs no edema    SSE:   SVE:  Closed, 70%, -4, membranes intact.  Vertex presentation.  Cerclage suture intact.  No evidence of bleeding.    FHT: Category 1  TOCO:  No contractions are noted.    Medical Decision Making:  Patient was reassured and normal 3rd trimester pregnancy discomforts were discussed.  Signs and symptoms of  labor were also discussed.  Patient questions were answered to her satisfaction and she expressed feeling better.    LABS:   No results found for this or any previous visit (from the past 24 hour(s)).  [unfilled]     Imaging Results     None          ASSESMENT: Tori Bojorquez is a 33 y.o.   at 35w6d with abdominal or round ligament pain of pregnancy.    Discharge Diagnosis/Clinical Impression:  Pregnancy 35 weeks.  Abdominal round ligament pain.    Status:Improved/stable    Disposition:  discharged to home    Medications:   Tylenol for discomfort    Patient Instructions:   Current Discharge Medication List        CONTINUE these medications which have NOT CHANGED    Details   aspirin 81 MG Chew Take 81 mg by mouth once daily.      nitrofurantoin, macrocrystal-monohydrate, (MACROBID) 100 MG capsule Take 100 mg by mouth 2 (two) times daily.      polycarbophil (FIBERCON) 625 mg tablet Take 625 mg by mouth once daily.      prenatal vit/iron fum/folic ac (PRENATAL 1+1 ORAL) Take by mouth.      progesterone (PROMETRIUM) 200 MG capsule Place 1 capsule (200 mg total) vaginally nightly.  Qty: 30 capsule, Refills: 4    Associated Diagnoses: Maternal care for cervical incompetence in second trimester; History of cerclage, currently pregnant, second trimester; Pregnancy complicated by cervical shortening in second trimester             - Pt was given routine pregnancy instructions including to return to triage if she had any vaginal bleeding (other than spotting for the next 48hrs), any loss of fluid like her water broke, decreased fetal movement, or contractions Q 5min lasting for 2 or more hours. Pt was also instructed to drink copious water. Patient voiced understanding of all these instructions and was subsequently discharged home.    She will follow up with her primary OB.    No discharge procedures on file.    Nav Hernandez MD  OB-GYN Hospitalist       Nav Hernandez MD  [phone]

## 2024-06-17 ENCOUNTER — HOSPITAL ENCOUNTER (OUTPATIENT)
Facility: HOSPITAL | Age: 34
Discharge: HOME | End: 2024-06-17
Attending: OBSTETRICS & GYNECOLOGY | Admitting: OBSTETRICS & GYNECOLOGY
Payer: MEDICAID

## 2024-06-17 VITALS
HEART RATE: 85 BPM | BODY MASS INDEX: 38.58 KG/M2 | DIASTOLIC BLOOD PRESSURE: 69 MMHG | TEMPERATURE: 99 F | SYSTOLIC BLOOD PRESSURE: 108 MMHG | OXYGEN SATURATION: 99 % | WEIGHT: 226 LBS | HEIGHT: 64 IN

## 2024-06-17 DIAGNOSIS — O47.9 UTERINE CONTRACTIONS: ICD-10-CM

## 2024-06-17 DIAGNOSIS — O99.213 OBESITY AFFECTING PREGNANCY IN THIRD TRIMESTER, UNSPECIFIED OBESITY TYPE: ICD-10-CM

## 2024-06-17 DIAGNOSIS — O26.899 ABDOMINAL PAIN COMPLICATING PREGNANCY: Primary | ICD-10-CM

## 2024-06-17 DIAGNOSIS — R10.9 ABDOMINAL PAIN COMPLICATING PREGNANCY: Primary | ICD-10-CM

## 2024-06-17 PROCEDURE — 59025 FETAL NON-STRESS TEST: CPT

## 2024-06-17 PROCEDURE — 76819 FETAL BIOPHYS PROFIL W/O NST: CPT | Mod: 26,,, | Performed by: OBSTETRICS & GYNECOLOGY

## 2024-06-17 NOTE — PROCEDURES
BIOPHYSICAL PROFILE  REPORT    DATE OF ULTRASOUND: 2024     INDICATION:  Abdominal pains, increased BMI, NST nonreactive     Gestational Age: 36w3d     Movement: 2/2  Tone:  2/2  Breathin/2  Fluid:  2/2     FHR:  146 bpm during ultrasound    PLACIDO:  16.9    Presentation: Cephalic        Impression:  Biophysical profile          Sanket Russo MD       Components of this note were documented using voice recognition systems; and are subject to errors not corrected at proof reading. Please contact author for any clarifications.

## 2024-06-17 NOTE — NURSING
Discharge paperwork discussed with patient. Patient verbalized understanding. Pt ambulated self off unit without difficulty.

## 2024-06-17 NOTE — DISCHARGE INSTRUCTIONS
Return to hospital if leaking of fluid, abdominal pain, decreased fetal movement, or vaginal bleeding. Keep scheduled appointments with primary OB and maternal fetal medicine doctor.

## 2024-06-18 ENCOUNTER — OFFICE VISIT (OUTPATIENT)
Dept: MATERNAL FETAL MEDICINE | Facility: CLINIC | Age: 34
End: 2024-06-18
Payer: MEDICAID

## 2024-06-18 ENCOUNTER — PROCEDURE VISIT (OUTPATIENT)
Dept: MATERNAL FETAL MEDICINE | Facility: CLINIC | Age: 34
End: 2024-06-18
Payer: MEDICAID

## 2024-06-18 VITALS
WEIGHT: 225 LBS | HEIGHT: 64 IN | DIASTOLIC BLOOD PRESSURE: 70 MMHG | HEART RATE: 104 BPM | BODY MASS INDEX: 38.41 KG/M2 | SYSTOLIC BLOOD PRESSURE: 106 MMHG

## 2024-06-18 DIAGNOSIS — O99.213 OBESITY AFFECTING PREGNANCY IN THIRD TRIMESTER, UNSPECIFIED OBESITY TYPE: ICD-10-CM

## 2024-06-18 DIAGNOSIS — E66.01 MORBID OBESITY: ICD-10-CM

## 2024-06-18 DIAGNOSIS — O09.90 AT HIGH RISK FOR COMPLICATIONS OF INTRAUTERINE PREGNANCY (IUP): ICD-10-CM

## 2024-06-18 DIAGNOSIS — O09.293 HISTORY OF CERCLAGE, CURRENTLY PREGNANT, THIRD TRIMESTER: ICD-10-CM

## 2024-06-18 DIAGNOSIS — Z98.890 HISTORY OF CERCLAGE, CURRENTLY PREGNANT, THIRD TRIMESTER: ICD-10-CM

## 2024-06-18 DIAGNOSIS — O26.873 PREGNANCY COMPLICATED BY CERVICAL SHORTENING IN THIRD TRIMESTER: ICD-10-CM

## 2024-06-18 DIAGNOSIS — O34.219 PREVIOUS CESAREAN SECTION COMPLICATING PREGNANCY: Primary | ICD-10-CM

## 2024-06-18 DIAGNOSIS — O34.33 MATERNAL CARE FOR CERVICAL INCOMPETENCE IN THIRD TRIMESTER: ICD-10-CM

## 2024-06-18 PROCEDURE — 3078F DIAST BP <80 MM HG: CPT | Mod: CPTII,S$GLB,, | Performed by: OBSTETRICS & GYNECOLOGY

## 2024-06-18 PROCEDURE — 76819 FETAL BIOPHYS PROFIL W/O NST: CPT | Mod: S$GLB,,, | Performed by: OBSTETRICS & GYNECOLOGY

## 2024-06-18 PROCEDURE — 1159F MED LIST DOCD IN RCRD: CPT | Mod: CPTII,S$GLB,, | Performed by: OBSTETRICS & GYNECOLOGY

## 2024-06-18 PROCEDURE — 1160F RVW MEDS BY RX/DR IN RCRD: CPT | Mod: CPTII,S$GLB,, | Performed by: OBSTETRICS & GYNECOLOGY

## 2024-06-18 PROCEDURE — 3074F SYST BP LT 130 MM HG: CPT | Mod: CPTII,S$GLB,, | Performed by: OBSTETRICS & GYNECOLOGY

## 2024-06-18 PROCEDURE — 3008F BODY MASS INDEX DOCD: CPT | Mod: CPTII,S$GLB,, | Performed by: OBSTETRICS & GYNECOLOGY

## 2024-06-18 PROCEDURE — 99213 OFFICE O/P EST LOW 20 MIN: CPT | Mod: TH,S$GLB,, | Performed by: OBSTETRICS & GYNECOLOGY

## 2024-06-18 NOTE — PROGRESS NOTES
Maternal Fetal Medicine Follow Up    Subjective     Patient ID: [id number]    Chief Complaint: MFM follow up with US (Patient is having hip, back pain and irregular contractions.)      HPI: Tori Bojorquez is a 33 y.o. female  at 36w4d gestation with Estimated Date of Delivery: 24  who is here for follow  up consultation by MFM.    She has had 3 previous C-sections.  Her last  was on 3/2023.  She has history of  delivery at 27 weeks in her 1st pregnancy. She has history of cervical incompetence with cerclage placed early in the 2nd pregnancy.  She also received weekly Abigail injections in that pregnancy and delivered at 38 weeks.  She reported that she had cervical surveillance last pregnancy with OB in [place], and underwent ultrasound indicated cerclage at 18 weeks, and was on nightly vaginal progesterone with delivery at 38 weeks.  She is s/p prophylactic cerclage on 1/10/2024.  She had relatively short cervix noted on 2024 at 2.7 cm and was started on nightly vaginal progesterone.  She had increased BMI 35.5 on initial MFM consultation visit.  She is on prophylactic low-dose aspirin daily.    Patient complains of some hip pain and irregular contractions at times as often as every 15 minutes, but reports they resolve with rest and hydration.  She has been to the hospital multiple times recently for evaluation with reassuring workup.  Denies any leaking fluid vaginal bleeding or increase in intensity or frequency of contractions.       Interval history since last MFM visit: None.. She denies any leaking fluid, vaginal bleeding, contractions, decreased fetal movement. Denies headaches, visual disturbances, or epigastric pain.    Pregnancy complications include:   Patient Active Problem List   Diagnosis    At high risk for complications of intrauterine pregnancy (IUP)    Maternal care for cervical incompetence in third trimester    History of cerclage, currently pregnant, third  "trimester    Rh negative status during pregnancy in third trimester    Previous  section x3 complicating pregnancy    Pregnancy complicated by cervical shortening, s/p cerclage, in third trimester    Increased BMI affecting pregnancy in third trimester    BMI 35.5 at initial [facility] consultation visit    Abdominal pain complicating pregnancy        No changes to medical, surgical, family, social, or obstetric history.    Medications:  Current Outpatient Medications   Medication Instructions    aspirin 81 mg, Oral, Daily    nitrofurantoin, macrocrystal-monohydrate, (MACROBID) 100 MG capsule 100 mg, Oral, 2 times daily    polycarbophil (FIBERCON) 625 mg, Oral, Daily    prenatal vit/iron fum/folic ac (PRENATAL 1+1 ORAL) Oral    progesterone (PROMETRIUM) 200 mg, Vaginal, Nightly       Review of Systems   12 point review of systems conducted, negative except as stated in the history of present illness. See HPI for details.      Objective     Visit Vitals  /70 (BP Location: Left arm, Patient Position: Sitting, BP Method: Large (Automatic))   Pulse 104   Ht 5' 4" (1.626 m)   Wt 102.1 kg (225 lb)   LMP 10/16/2023 (Approximate)   BMI 38.62 kg/m²        Physical Exam  Vitals and nursing note reviewed.   Constitutional:       Appearance: Normal appearance.      Comments: Increased BMI   HENT:      Head: Normocephalic and atraumatic.      Nose: Nose normal. No congestion.   Cardiovascular:      Rate and Rhythm: Normal rate.   Pulmonary:      Effort: Pulmonary effort is normal.   Skin:     Findings: No rash.   Neurological:      Mental Status: She is alert and oriented to person, place, and time.   Psychiatric:         Mood and Affect: Mood normal.         Behavior: Behavior normal.         Thought Content: Thought content normal.         Judgment: Judgment normal.         ASSESSMENT/PLAN:     33 y.o.  female with IUP at 36w4d    History of  delivery at 26 weeks in previous pregnancy with history of " cerclage x2, s/p cerclage  There is upper normal fetal growth with an EFW of 2944 g at the 51% and the AC at the 95% on 24.  AFV is normal. BPP is 8/8 today (2024).     She is aware of risks associated with cerclage placement and that goal is to decrease risk of  labor and delivery. She is to continue limited activity/home rest with ambulation/exercising BLE for DVT prophylaxis, no tub baths, and pelvic rest and continue vaginal progesterone suppositories every night. She was instructed to report immediately any abdominal cramping, vaginal bleeding or change in discharge.     She will continue until vaginal progesterone nightly until 36 weeks 6 days.    Plan for cerclage removal at time of repeat , unless indicated earlier.  PTL precautions reviewed.       History of 3 previous C-sections  She is aware of risk with previous  section, including improper placental implantation and abnormalities such as accreta, placental abruption and risks of uterine rupture with labor with need for emergency  section with  morbidity/mortality associated with that. She is to report any significant cramping or vaginal bleeding.       With repeat cesareans, standard of care is to deliver at 39 weeks, if there are no other obstetric risk factor for earlier delivery. With multiple  sections, there is greater potential for placental abruption, uterine rupture and improper placental implantation not detected antenatally requiring emergency delivery, if labor ensues. Although standard of care does not endorse delivery prior to 39 weeks, there is some data suggesting worsening  outcomes with delivery after 38 weeks. It may be reasonable and beneficial to deliver at 38 -39 weeks, understanding risks for issues of prematurity and need for NICU admission in this setting.    With history of multiple c-sections, previously advised against future pregnancy with risks as above.         Increased BMI in pregnancy  Body mass index is 38.62 kg/m². With previous excessive weight gain, currently with stable weight since last visit, she was advised to follow a healthy low caloric diet.  Excess weight gain would be associated with gestational hypertension, gestational diabetes and adverse  outcomes, including fetal demise in utero.    It is important to lose weight after the pregnancy is over, especially before a future pregnancy. Breastfeeding may be an important tool in reducing the postpartum weight retention. Fetal risks were discussed with short term risk of fetal/ obesity and long term risk of adolescent component of metabolic syndrome.    With elevated BMI greater than 35, we will continue weekly fetal testing until delivery.  Reviewed importance of FKC 3/day and prn with instructions to immediately report any decreased fetal movement.      At high risk for preeclampsia  With her increased risk for preeclampsia, she agreed to continue asa 81 mg daily until delivery.. Preeclampsia precautions reviewed.       Hip pain/irregular contractions  Discussed supportive measures.   labor precautions reviewed.    Fetal testing is reassuring.  Continue fetal kick counts.  Discussed with the patient the risks including risk of uterine dehiscence from labor that had to be weighed against the risk of prematurity.  With no evidence of labor, it would be best to try to get as close to 39 weeks as possible understanding need for lower threshold for earlier delivery if frequent uterine activity.  If onset of labor then proceed with delivery at any time.  Discussed with Dr. Sanchez.    Follow up in about 1 week (around 2024) for MFM follow-up, BPP.     No future appointments.      DESMOND involvement: Patient was evaluated by SHARI Monroe and Dr. Russo.  Final assessment and recommendations as stated above were made by Dr. Russo.    This note was created with the assistance of  MModal voice recognition software. There may be transcription errors as a result of using this technology, however minimal. Effort has been made to ensure accuracy of transcription, but any obvious errors or omissions should be clarified with the author of the document.

## 2024-06-19 DIAGNOSIS — O99.213 OBESITY AFFECTING PREGNANCY IN THIRD TRIMESTER, UNSPECIFIED OBESITY TYPE: ICD-10-CM

## 2024-06-19 DIAGNOSIS — O09.293 HISTORY OF CERCLAGE, CURRENTLY PREGNANT, THIRD TRIMESTER: ICD-10-CM

## 2024-06-19 DIAGNOSIS — Z98.890 HISTORY OF CERCLAGE, CURRENTLY PREGNANT, THIRD TRIMESTER: ICD-10-CM

## 2024-06-19 DIAGNOSIS — O34.219 PREVIOUS CESAREAN SECTION COMPLICATING PREGNANCY: Primary | ICD-10-CM

## 2024-06-21 ENCOUNTER — HOSPITAL ENCOUNTER (EMERGENCY)
Facility: HOSPITAL | Age: 34
Discharge: HOME | End: 2024-06-21
Attending: OBSTETRICS & GYNECOLOGY | Admitting: OBSTETRICS & GYNECOLOGY
Payer: MEDICAID

## 2024-06-21 VITALS — RESPIRATION RATE: 22 BRPM | TEMPERATURE: 98 F

## 2024-06-21 PROBLEM — Z3A.37 PREGNANCY WITH 37 WEEKS COMPLETED GESTATION: Status: ACTIVE | Noted: 2024-06-21

## 2024-06-21 LAB
CTP QC/QA: YES
RUPTURE OF MEMBRANE: NEGATIVE

## 2024-06-21 PROCEDURE — 99284 EMERGENCY DEPT VISIT MOD MDM: CPT

## 2024-06-21 PROCEDURE — 84112 EVAL AMNIOTIC FLUID PROTEIN: CPT

## 2024-06-22 NOTE — ASSESSMENT & PLAN NOTE
Maternal and fetal status reassuring, no evidence of labor.  Pt observed for 2 hours.  Pt felt improved after oral hydration

## 2024-06-22 NOTE — HOSPITAL COURSE
PT observed for signs of labor.  Ctx's noted to be very irregular > 10 minutes apart.  Pt was orally hydrated and felt improved.  Maternal and fetal status were reassuring.

## 2024-06-22 NOTE — SUBJECTIVE & OBJECTIVE
Obstetric HPI:  Patient reports Intensity: moderate contractions, active fetal movement, No vaginal bleeding , No loss of fluid     This pregnancy has been complicated by 3 prior Csecs, hx of incompetent cervix currently with cerclage, maternal obesity    OB History    Para Term  AB Living   4 3 2 1 0 3   SAB IAB Ectopic Multiple Live Births   0 0 0 0 3      # Outcome Date GA Lbr Anival/2nd Weight Sex Type Anes PTL Lv   4 Current            3 Term 23 38w4d  3.515 kg (7 lb 12 oz) M CS-LTranv Spinal N TOMMIE      Name: BETTY CANDELARIA      Apgar1: 9  Apgar5: 9   2 Term 14 38w0d  3.629 kg (8 lb) F CS-LTranv Spinal N TOMMIE   1  09 27w0d  1.106 kg (2 lb 7 oz) M CS-LTranv Gen Y TOMMIE     Past Medical History:   Diagnosis Date    Cervical incompetence during pregnancy in second trimester     Chronic hematoma ABOVE PLACENTA     pt states resolved    Rh incompatibility     b neg     Past Surgical History:   Procedure Laterality Date    CERVICAL CERCLAGE N/A 1/10/2024    Procedure: CERCLAGE, CERVIX;  Surgeon: Sanket Russo MD;  Location: [facility];  Service: OB/GYN;  Laterality: N/A;     SECTION      , ,      SECTION N/A 2023    Procedure:  SECTION;  Surgeon: Nader Sanchez MD;  Location: [facility] L&D;  Service: OB/GYN;  Laterality: N/A;    CHOLECYSTECTOMY      REMOVAL OF CERCLAGE SUTURE N/A 2023    Procedure: REMOVAL, CERCLAGE SUTURE;  Surgeon: Nader Sanchez MD;  Location: [facility];  Service: OB/GYN;  Laterality: N/A;    TONSILLECTOMY AND ADENOIDECTOMY         (Not in a hospital admission)      Review of patient's allergies indicates:   Allergen Reactions    Bactrim [sulfamethoxazole-trimethoprim]         Family History       Problem Relation (Age of Onset)    Eclampsia Mother          Tobacco Use    Smoking status: Former     Average packs/day: 2.0 packs/day for 10.0 years (20.0 ttl pk-yrs)     Types: Cigarettes, Vaping with nicotine      Start date:      Quit date:      Years since quittin.4     Passive exposure: Past    Smokeless tobacco: Never   Substance and Sexual Activity    Alcohol use: Not Currently    Drug use: Never    Sexual activity: Yes     Review of Systems   Constitutional: Negative.    Respiratory: Negative.     Cardiovascular: Negative.    Gastrointestinal: Negative.    Endocrine: Negative.    Genitourinary: Negative.    Musculoskeletal: Negative.    Neurological: Negative.    Psychiatric/Behavioral: Negative.     Breast: negative.       Objective:     Vital Signs (Most Recent):  Temp: 98 °F (36.7 °C) (24)  Resp: (!) 22 (24) Vital Signs (24h Range):  Temp:  [98 °F (36.7 °C)] 98 °F (36.7 °C)  Resp:  [22] 22        There is no height or weight on file to calculate BMI.    FHT: 140, reactive Cat 1 (reassuring)  TOCO:  > 10 minutes apart, irregular     Physical Exam:   Constitutional: She is oriented to person, place, and time. She appears well-developed and well-nourished. No distress.    HENT:   Head: Normocephalic and atraumatic.     Neck: No thyromegaly present.     Pulmonary/Chest: Effort normal. No respiratory distress.        Abdominal: Soft. She exhibits no distension and no mass. There is no abdominal tenderness. There is no rebound and no guarding.             Musculoskeletal: Normal range of motion and moves all extremeties. No tenderness.       Neurological: She is alert and oriented to person, place, and time. No cranial nerve deficit. Coordination normal.    Skin: She is not diaphoretic.    Psychiatric: She has a normal mood and affect. Her behavior is normal. Judgment and thought content normal.          Significant Labs:  Lab Results   Component Value Date    GROUPTRH B NEG 06/10/2024    HEPBSAG Negative 2022    STREPBCULT unknown 2023       I have personallly reviewed all pertinent lab results from the last 24 hours.

## 2024-06-22 NOTE — NURSING
Discharge instructions reviewed with patient, patient ambulated off unit to waiting vehicle with all personal belongings intact.

## 2024-06-22 NOTE — ASSESSMENT & PLAN NOTE
Maternal and fetal status reassuring, no evidence of labor.  Pt observed for 2 hours.  Pt felt improved after oral hydration; p[t to contact office of primary OB on 6/23 for reassessment

## 2024-06-22 NOTE — H&P
[facility]  Obstetrics  History & Physical    Patient Name: Tori Bojorquez  MRN: [id number]  Admission Date: 2024  Primary Care Provider: Rebecca, Primary Doctor    Subjective:     Principal Problem:Abdominal pain complicating pregnancy    History of Present Illness:  33 yr  @ 37 wk 0 d presents to [facility] with abd pain and believes she may be in later.  Preg complicated by cerclage placement due to hx of incompetent cervix as well as 3 prior Csections.  Good FM, no VB, no LOF.  Preg also complicated by maternal obesity.  Cerclage is still in place, not removed at 36 weeks.    Obstetric HPI:  Patient reports Intensity: moderate contractions, active fetal movement, No vaginal bleeding , No loss of fluid     This pregnancy has been complicated by 3 prior Csecs, hx of incompetent cervix currently with cerclage, maternal obesity    OB History    Para Term  AB Living   4 3 2 1 0 3   SAB IAB Ectopic Multiple Live Births   0 0 0 0 3      # Outcome Date GA Lbr Anival/2nd Weight Sex Type Anes PTL Lv   4 Current            3 Term 23 38w4d  3.515 kg (7 lb 12 oz) M CS-LTranv Spinal N TOMMIE      Name: BETTY BOJORQUEZ      Apgar1: 9  Apgar5: 9   2 Term 14 38w0d  3.629 kg (8 lb) F CS-LTranv Spinal N TOMMIE   1  09 27w0d  1.106 kg (2 lb 7 oz) M CS-LTranv Gen Y TOMMIE     Past Medical History:   Diagnosis Date    Cervical incompetence during pregnancy in second trimester     Chronic hematoma ABOVE PLACENTA     pt states resolved    Rh incompatibility     b neg     Past Surgical History:   Procedure Laterality Date    CERVICAL CERCLAGE N/A 1/10/2024    Procedure: CERCLAGE, CERVIX;  Surgeon: Sanket Russo MD;  Location: [facility];  Service: OB/GYN;  Laterality: N/A;     SECTION      , ,      SECTION N/A 2023    Procedure:  SECTION;  Surgeon: Nader Sanchez MD;  Location: [facility] L&D;  Service: OB/GYN;  Laterality: N/A;     CHOLECYSTECTOMY  2017    REMOVAL OF CERCLAGE SUTURE N/A 2023    Procedure: REMOVAL, CERCLAGE SUTURE;  Surgeon: Nader Sanchez MD;  [facility]: [facility];  Service: OB/GYN;  Laterality: N/A;    TONSILLECTOMY AND ADENOIDECTOMY         (Not in a hospital admission)      Review of patient's allergies indicates:   Allergen Reactions    Bactrim [sulfamethoxazole-trimethoprim]         Family History       Problem Relation (Age of Onset)    Eclampsia Mother          Tobacco Use    Smoking status: Former     Average packs/day: 2.0 packs/day for 10.0 years (20.0 ttl pk-yrs)     Types: Cigarettes, Vaping with nicotine     Start date:      Quit date:      Years since quittin.4     Passive exposure: Past    Smokeless tobacco: Never   Substance and Sexual Activity    Alcohol use: Not Currently    Drug use: Never    Sexual activity: Yes     Review of Systems   Constitutional: Negative.    Respiratory: Negative.     Cardiovascular: Negative.    Gastrointestinal: Negative.    Endocrine: Negative.    Genitourinary: Negative.    Musculoskeletal: Negative.    Neurological: Negative.    Psychiatric/Behavioral: Negative.     Breast: negative.       Objective:     Vital Signs (Most Recent):  Temp: 98 °F (36.7 °C) (24)  Resp: (!) 22 (24) Vital Signs (24h Range):  Temp:  [98 °F (36.7 °C)] 98 °F (36.7 °C)  Resp:  [22] 22        There is no height or weight on file to calculate BMI.    FHT: 140, reactive Cat 1 (reassuring)  TOCO:  > 10 minutes apart, irregular     Physical Exam:   Constitutional: She is oriented to person, place, and time. She appears well-developed and well-nourished. No distress.    HENT:   Head: Normocephalic and atraumatic.     Neck: No thyromegaly present.     Pulmonary/Chest: Effort normal. No respiratory distress.        Abdominal: Soft. She exhibits no distension and no mass. There is no abdominal tenderness. There is no rebound and no guarding.              Musculoskeletal: Normal range of motion and moves all extremeties. No tenderness.       Neurological: She is alert and oriented to person, place, and time. No cranial nerve deficit. Coordination normal.    Skin: She is not diaphoretic.    Psychiatric: She has a normal mood and affect. Her behavior is normal. Judgment and thought content normal.          Significant Labs:  Lab Results   Component Value Date    GROUPTRH B NEG 06/10/2024    HEPBSAG Negative 2022    STREPBCULT unknown 2023       I have personallly reviewed all pertinent lab results from the last 24 hours.  Assessment/Plan:     33 y.o. female  at 37w0d for:    * Abdominal pain complicating pregnancy  Maternal and fetal status reassuring, no evidence of labor.  Pt observed for 2 hours.  Pt felt improved after oral hydration; p[t to contact office of primary OB on  for reassessment    Pregnancy with 37 weeks completed gestation  Maternal and fetal status reassuring, no evidence of labor.  Pt observed for 2 hours.  Pt felt improved after oral hydration    History of cerclage, currently pregnant, third trimester  Maternal and fetal status reassuring, no evidence of labor.  Pt observed for 2 hours.  Pt felt improved after oral hydration    At high risk for complications of intrauterine pregnancy (IUP)  Maternal and fetal status reassuring, no evidence of labor.  Pt observed for 2 hours.  Pt felt improved after oral hydration        Ruben Arriaga MD  Obstetrics  [facility] - Emergency Dept (OB)

## 2024-06-22 NOTE — HPI
33 yr  @ 37 wk 0 d presents to [facility] with abd pain and believes she may be in later.  Preg complicated by cerclage placement due to hx of incompetent cervix as well as 3 prior Csections.  Good FM, no VB, no LOF.  Preg also complicated by maternal obesity.  Cerclage is still in place, not removed at 36 weeks.

## 2024-06-24 PROBLEM — R10.9 ABDOMINAL PAIN COMPLICATING PREGNANCY: Status: RESOLVED | Noted: 2024-06-17 | Resolved: 2024-06-24

## 2024-06-24 PROBLEM — O26.899 ABDOMINAL PAIN COMPLICATING PREGNANCY: Status: RESOLVED | Noted: 2024-06-17 | Resolved: 2024-06-24

## 2024-06-24 NOTE — PROGRESS NOTES
Maternal Fetal Medicine Follow Up    Subjective     Patient ID: [id number]    Chief Complaint: M follow up with US      HPI: Tori Bojorquez is a 33 y.o. female  at 37w4d gestation with Estimated Date of Delivery: 24  who is here for follow  up consultation by MFM.    She has had 3 previous C-sections.  Her last  was on 3/2023.  She has history of  delivery at 27 weeks in her 1st pregnancy. She has history of cervical incompetence with cerclage placed early in the 2nd pregnancy.  She also received weekly Caruthers injections in that pregnancy and delivered at 38 weeks.  She reported that she had cervical surveillance last pregnancy with OB in [place], and underwent ultrasound indicated cerclage at 18 weeks, and was on nightly vaginal progesterone with delivery at 38 weeks.  She is s/p prophylactic cerclage on 1/10/2024.  She had relatively short cervix noted on 2024 at 2.7 cm and was started on nightly vaginal progesterone which she stopped taking just before 37 weeks.  She had increased BMI 35.5 on initial MFM consultation visit.  She is on prophylactic low-dose aspirin daily.         Interval history since last MFM visit: None.. She denies any leaking fluid, vaginal bleeding, contractions, decreased fetal movement. Denies headaches, visual disturbances, or epigastric pain.    Pregnancy complications include:   Patient Active Problem List   Diagnosis    At high risk for complications of intrauterine pregnancy (IUP)    Maternal care for cervical incompetence in third trimester    History of cerclage, currently pregnant, third trimester    Rh negative status during pregnancy in third trimester    Previous  section x3 complicating pregnancy    Pregnancy complicated by cervical shortening, s/p cerclage, in third trimester    Increased BMI affecting pregnancy in third trimester    BMI 35.5 at initial M consultation visit    Pregnancy with 37 weeks completed gestation        No  "changes to medical, surgical, family, social, or obstetric history.    Medications:  Current Outpatient Medications   Medication Instructions    aspirin 81 mg, Oral, Daily    nitrofurantoin, macrocrystal-monohydrate, (MACROBID) 100 MG capsule 100 mg, 2 times daily    polycarbophil (FIBERCON) 625 mg, Oral, Daily    prenatal vit/iron fum/folic ac (PRENATAL 1+1 ORAL) Oral    progesterone (PROMETRIUM) 200 mg, Vaginal, Nightly       Review of Systems   12 point review of systems conducted, negative except as stated in the history of present illness. See HPI for details.      Objective     Visit Vitals  /72 (BP Location: Left arm, Patient Position: Sitting, BP Method: Large (Automatic))   Pulse 84   Ht 5' 4" (1.626 m)   Wt 103.4 kg (228 lb)   LMP 10/16/2023 (Approximate)   BMI 39.14 kg/m²        Physical Exam  Vitals and nursing note reviewed.   Constitutional:       Appearance: Normal appearance.      Comments: Increased BMI   HENT:      Head: Normocephalic and atraumatic.      Nose: Nose normal. No congestion.   Cardiovascular:      Rate and Rhythm: Normal rate.   Pulmonary:      Effort: Pulmonary effort is normal.   Skin:     Findings: No rash.   Neurological:      Mental Status: She is alert and oriented to person, place, and time.   Psychiatric:         Mood and Affect: Mood normal.         Behavior: Behavior normal.         Thought Content: Thought content normal.         Judgment: Judgment normal.         ASSESSMENT/PLAN:     33 y.o.  female with IUP at 37w4d    History of  delivery at 26 weeks in previous pregnancy with history of cerclage x2, s/p cerclage  There was upper normal fetal growth with an EFW of 2944 g at the 51% and the AC at the 95% on 24.  AFV is normal. BPP is 8/8 today (2024).     She is aware of risks associated with cerclage placement and that goal is to decrease risk of  labor and delivery. She is to continue limited activity/home rest with " ambulation/exercising BLE for DVT prophylaxis, no tub baths, and pelvic rest and continue vaginal progesterone suppositories every night. She was instructed to report immediately any abdominal cramping, vaginal bleeding or change in discharge.     Plan for cerclage removal at time of repeat , unless indicated earlier.  Labor precautions given.      History of 3 previous C-sections  She is aware of risk with previous  section, including improper placental implantation and abnormalities such as accreta, placental abruption and risks of uterine rupture with labor with need for emergency  section with  morbidity/mortality associated with that. She is to report any significant cramping or vaginal bleeding.       With repeat cesareans, standard of care is to deliver at 39 weeks, if there are no other obstetric risk factor for earlier delivery. With multiple  sections, there is greater potential for placental abruption, uterine rupture and improper placental implantation not detected antenatally requiring emergency delivery, if labor ensues.     patient will have an nst at the hospital next week with dr. hansen.  if increased uterine activity after 38 weeks then recommend proceeding with delivery at that time otherwise scheduled delivery at 39 weeks.  Discussed with Dr. Mcgovern    With history of multiple c-sections, previously advised against future pregnancy with risks as above.        Increased BMI in pregnancy  Body mass index is 39.14 kg/m². With borderline excessive weight gain, 3 lbs in 1 week , she was advised to decrease caloric intake and was reminded of the importance of avoiding excessive weight gain.  Excess weight gain would be associated with gestational hypertension, gestational diabetes and adverse  outcomes, including fetal demise in utero.    Reviewed importance of FKC 3/day and prn with instructions to immediately report any decreased fetal  movement.    It is important to lose weight after the pregnancy is over, especially before a future pregnancy. Breastfeeding may be an important tool in reducing the postpartum weight retention. Fetal risks were discussed with short term risk of fetal/ obesity and long term risk of adolescent component of metabolic syndrome.    With elevated BMI greater than 35, we will continue weekly fetal testing until delivery.  Reviewed importance of FKC 3/day and prn with instructions to immediately report any decreased fetal movement.      At high risk for preeclampsia  With her increased risk for preeclampsia, she agreed to continue asa 81 mg daily until delivery.. Preeclampsia precautions reviewed.       Follow up for NST at hospital next week.     No future appointments.        DESMOND involvement: Patient was evaluated and examined by Dr. Russo. ALISHA Garcia, helped in pre charting of part of note.    This note was created with the assistance of [employer] voice recognition software. There may be transcription errors as a result of using this technology, however minimal. Effort has been made to ensure accuracy of transcription, but any obvious errors or omissions should be clarified with the author of the document.

## 2024-06-25 ENCOUNTER — PROCEDURE VISIT (OUTPATIENT)
Dept: MATERNAL FETAL MEDICINE | Facility: CLINIC | Age: 34
End: 2024-06-25
Payer: MEDICAID

## 2024-06-25 ENCOUNTER — OFFICE VISIT (OUTPATIENT)
Dept: MATERNAL FETAL MEDICINE | Facility: CLINIC | Age: 34
End: 2024-06-25
Payer: MEDICAID

## 2024-06-25 VITALS
SYSTOLIC BLOOD PRESSURE: 106 MMHG | WEIGHT: 228 LBS | BODY MASS INDEX: 38.93 KG/M2 | HEART RATE: 84 BPM | DIASTOLIC BLOOD PRESSURE: 72 MMHG | HEIGHT: 64 IN

## 2024-06-25 DIAGNOSIS — O99.213 OBESITY AFFECTING PREGNANCY IN THIRD TRIMESTER, UNSPECIFIED OBESITY TYPE: ICD-10-CM

## 2024-06-25 DIAGNOSIS — O34.219 PREVIOUS CESAREAN SECTION COMPLICATING PREGNANCY: ICD-10-CM

## 2024-06-25 DIAGNOSIS — O09.293 HISTORY OF CERCLAGE, CURRENTLY PREGNANT, THIRD TRIMESTER: ICD-10-CM

## 2024-06-25 DIAGNOSIS — O26.873 PREGNANCY COMPLICATED BY CERVICAL SHORTENING IN THIRD TRIMESTER: ICD-10-CM

## 2024-06-25 DIAGNOSIS — Z98.890 HISTORY OF CERCLAGE, CURRENTLY PREGNANT, THIRD TRIMESTER: ICD-10-CM

## 2024-06-25 DIAGNOSIS — O09.90 AT HIGH RISK FOR COMPLICATIONS OF INTRAUTERINE PREGNANCY (IUP): Primary | ICD-10-CM

## 2024-06-25 PROCEDURE — 3008F BODY MASS INDEX DOCD: CPT | Mod: CPTII,S$GLB,, | Performed by: OBSTETRICS & GYNECOLOGY

## 2024-06-25 PROCEDURE — 3074F SYST BP LT 130 MM HG: CPT | Mod: CPTII,S$GLB,, | Performed by: OBSTETRICS & GYNECOLOGY

## 2024-06-25 PROCEDURE — 1160F RVW MEDS BY RX/DR IN RCRD: CPT | Mod: CPTII,S$GLB,, | Performed by: OBSTETRICS & GYNECOLOGY

## 2024-06-25 PROCEDURE — 99213 OFFICE O/P EST LOW 20 MIN: CPT | Mod: TH,S$GLB,, | Performed by: OBSTETRICS & GYNECOLOGY

## 2024-06-25 PROCEDURE — 3078F DIAST BP <80 MM HG: CPT | Mod: CPTII,S$GLB,, | Performed by: OBSTETRICS & GYNECOLOGY

## 2024-06-25 PROCEDURE — 76819 FETAL BIOPHYS PROFIL W/O NST: CPT | Mod: S$GLB,,, | Performed by: OBSTETRICS & GYNECOLOGY

## 2024-06-25 PROCEDURE — 1159F MED LIST DOCD IN RCRD: CPT | Mod: CPTII,S$GLB,, | Performed by: OBSTETRICS & GYNECOLOGY

## 2024-06-26 ENCOUNTER — HOSPITAL ENCOUNTER (OUTPATIENT)
Facility: HOSPITAL | Age: 34
Discharge: HOME | End: 2024-06-26
Attending: OBSTETRICS & GYNECOLOGY | Admitting: OBSTETRICS & GYNECOLOGY
Payer: MEDICAID

## 2024-06-26 VITALS — OXYGEN SATURATION: 98 % | HEART RATE: 82 BPM | DIASTOLIC BLOOD PRESSURE: 67 MMHG | SYSTOLIC BLOOD PRESSURE: 114 MMHG

## 2024-06-26 DIAGNOSIS — Z34.90 PREGNANCY: ICD-10-CM

## 2024-06-26 PROCEDURE — 59025 FETAL NON-STRESS TEST: CPT

## 2024-07-02 ENCOUNTER — ANESTHESIA (OUTPATIENT)
Dept: OBSTETRICS AND GYNECOLOGY | Facility: HOSPITAL | Age: 34
End: 2024-07-02
Payer: MEDICAID

## 2024-07-02 ENCOUNTER — HOSPITAL ENCOUNTER (INPATIENT)
Facility: HOSPITAL | Age: 34
LOS: 3 days | Discharge: HOME | End: 2024-07-05
Attending: OBSTETRICS & GYNECOLOGY | Admitting: OBSTETRICS & GYNECOLOGY
Payer: MEDICAID

## 2024-07-02 ENCOUNTER — ANESTHESIA EVENT (OUTPATIENT)
Dept: OBSTETRICS AND GYNECOLOGY | Facility: HOSPITAL | Age: 34
End: 2024-07-02
Payer: MEDICAID

## 2024-07-02 DIAGNOSIS — Z98.891 PREVIOUS CESAREAN SECTION: Primary | ICD-10-CM

## 2024-07-02 DIAGNOSIS — Z3A.38 38 WEEKS GESTATION OF PREGNANCY: ICD-10-CM

## 2024-07-02 PROBLEM — Z34.90 PREGNANCY: Status: ACTIVE | Noted: 2024-07-02

## 2024-07-02 LAB
BASOPHILS # BLD AUTO: 0.02 X10(3)/MCL
BASOPHILS NFR BLD AUTO: 0.2 %
EOSINOPHIL # BLD AUTO: 0.05 X10(3)/MCL (ref 0–0.9)
EOSINOPHIL NFR BLD AUTO: 0.4 %
ERYTHROCYTE [DISTWIDTH] IN BLOOD BY AUTOMATED COUNT: 13.4 % (ref 11.5–17)
GROUP & RH: NORMAL
HCT VFR BLD AUTO: 33.2 % (ref 37–47)
HCT VFR BLD AUTO: 38.1 % (ref 37–47)
HGB BLD-MCNC: 11.1 G/DL (ref 12–16)
HGB BLD-MCNC: 12.5 G/DL (ref 12–16)
IMM GRANULOCYTES # BLD AUTO: 0.14 X10(3)/MCL (ref 0–0.04)
IMM GRANULOCYTES NFR BLD AUTO: 1.1 %
INDIRECT COOMBS: NORMAL
LYMPHOCYTES # BLD AUTO: 2.84 X10(3)/MCL (ref 0.6–4.6)
LYMPHOCYTES NFR BLD AUTO: 21.7 %
MCH RBC QN AUTO: 29.1 PG (ref 27–31)
MCHC RBC AUTO-ENTMCNC: 32.8 G/DL (ref 33–36)
MCV RBC AUTO: 88.8 FL (ref 80–94)
MONOCYTES # BLD AUTO: 0.65 X10(3)/MCL (ref 0.1–1.3)
MONOCYTES NFR BLD AUTO: 5 %
NEUTROPHILS # BLD AUTO: 9.37 X10(3)/MCL (ref 2.1–9.2)
NEUTROPHILS NFR BLD AUTO: 71.6 %
NRBC BLD AUTO-RTO: 0 %
PLATELET # BLD AUTO: 179 X10(3)/MCL (ref 130–400)
PMV BLD AUTO: 9.5 FL (ref 7.4–10.4)
RBC # BLD AUTO: 4.29 X10(6)/MCL (ref 4.2–5.4)
SPECIMEN OUTDATE: NORMAL
T PALLIDUM AB SER QL: NONREACTIVE
WBC # BLD AUTO: 13.07 X10(3)/MCL (ref 4.5–11.5)

## 2024-07-02 PROCEDURE — 37000009 HC ANESTHESIA EA ADD 15 MINS: Performed by: OBSTETRICS & GYNECOLOGY

## 2024-07-02 PROCEDURE — 88307 TISSUE EXAM BY PATHOLOGIST: CPT

## 2024-07-02 PROCEDURE — 37000008 HC ANESTHESIA 1ST 15 MINUTES: Performed by: OBSTETRICS & GYNECOLOGY

## 2024-07-02 PROCEDURE — 71000033 HC RECOVERY, INTIAL HOUR: Performed by: OBSTETRICS & GYNECOLOGY

## 2024-07-02 PROCEDURE — 63600175 PHARM REV CODE 636 W HCPCS: Performed by: OBSTETRICS & GYNECOLOGY

## 2024-07-02 PROCEDURE — 88302 TISSUE EXAM BY PATHOLOGIST: CPT

## 2024-07-02 PROCEDURE — 11000001 HC ACUTE MED/SURG PRIVATE ROOM

## 2024-07-02 PROCEDURE — 36004725 HC OB OR TIME LEV III - EA ADD 15 MIN: Performed by: OBSTETRICS & GYNECOLOGY

## 2024-07-02 PROCEDURE — 86780 TREPONEMA PALLIDUM: CPT | Performed by: OBSTETRICS & GYNECOLOGY

## 2024-07-02 PROCEDURE — 63600175 PHARM REV CODE 636 W HCPCS

## 2024-07-02 PROCEDURE — 27000492 HC SLEEVE, SCD T/L

## 2024-07-02 PROCEDURE — 85018 HEMOGLOBIN: CPT | Performed by: OBSTETRICS & GYNECOLOGY

## 2024-07-02 PROCEDURE — 36004725 HC OB OR TIME LEV III - EA ADD 15 MIN: Mod: SZN

## 2024-07-02 PROCEDURE — 0UB70ZZ EXCISION OF BILATERAL FALLOPIAN TUBES, OPEN APPROACH: ICD-10-PCS | Performed by: OBSTETRICS & GYNECOLOGY

## 2024-07-02 PROCEDURE — 86900 BLOOD TYPING SEROLOGIC ABO: CPT | Performed by: OBSTETRICS & GYNECOLOGY

## 2024-07-02 PROCEDURE — 62322 NJX INTERLAMINAR LMBR/SAC: CPT

## 2024-07-02 PROCEDURE — 86923 COMPATIBILITY TEST ELECTRIC: CPT | Mod: 91 | Performed by: OBSTETRICS & GYNECOLOGY

## 2024-07-02 PROCEDURE — 0UCC7ZZ EXTIRPATION OF MATTER FROM CERVIX, VIA NATURAL OR ARTIFICIAL OPENING: ICD-10-PCS | Performed by: OBSTETRICS & GYNECOLOGY

## 2024-07-02 PROCEDURE — 51702 INSERT TEMP BLADDER CATH: CPT

## 2024-07-02 PROCEDURE — 85025 COMPLETE CBC W/AUTO DIFF WBC: CPT | Performed by: OBSTETRICS & GYNECOLOGY

## 2024-07-02 PROCEDURE — 37000009 HC ANESTHESIA EA ADD 15 MINS: Mod: SZN

## 2024-07-02 PROCEDURE — 63600175 PHARM REV CODE 636 W HCPCS: Mod: JZ,JG | Performed by: STUDENT IN AN ORGANIZED HEALTH CARE EDUCATION/TRAINING PROGRAM

## 2024-07-02 PROCEDURE — 88300 SURGICAL PATH GROSS: CPT | Performed by: OBSTETRICS & GYNECOLOGY

## 2024-07-02 PROCEDURE — 25000003 PHARM REV CODE 250: Performed by: OBSTETRICS & GYNECOLOGY

## 2024-07-02 PROCEDURE — 36415 COLL VENOUS BLD VENIPUNCTURE: CPT | Performed by: OBSTETRICS & GYNECOLOGY

## 2024-07-02 PROCEDURE — 25000003 PHARM REV CODE 250

## 2024-07-02 PROCEDURE — 86901 BLOOD TYPING SEROLOGIC RH(D): CPT | Performed by: OBSTETRICS & GYNECOLOGY

## 2024-07-02 PROCEDURE — 36004724 HC OB OR TIME LEV III - 1ST 15 MIN: Performed by: OBSTETRICS & GYNECOLOGY

## 2024-07-02 RX ORDER — KETOROLAC TROMETHAMINE 30 MG/ML
INJECTION, SOLUTION INTRAMUSCULAR; INTRAVENOUS
Status: DISCONTINUED | OUTPATIENT
Start: 2024-07-02 | End: 2024-07-02

## 2024-07-02 RX ORDER — BISACODYL 10 MG/1
10 SUPPOSITORY RECTAL ONCE AS NEEDED
Status: COMPLETED | OUTPATIENT
Start: 2024-07-02 | End: 2024-07-05

## 2024-07-02 RX ORDER — MISOPROSTOL 100 UG/1
800 TABLET ORAL ONCE AS NEEDED
Status: DISCONTINUED | OUTPATIENT
Start: 2024-07-02 | End: 2024-07-05 | Stop reason: HOSPADM

## 2024-07-02 RX ORDER — OXYTOCIN-SODIUM CHLORIDE 0.9% IV SOLN 30 UNIT/500ML 30-0.9/5 UT/ML-%
95 SOLUTION INTRAVENOUS ONCE AS NEEDED
Status: DISCONTINUED | OUTPATIENT
Start: 2024-07-02 | End: 2024-07-05 | Stop reason: HOSPADM

## 2024-07-02 RX ORDER — SODIUM CITRATE AND CITRIC ACID MONOHYDRATE 334; 500 MG/5ML; MG/5ML
30 SOLUTION ORAL
Status: DISCONTINUED | OUTPATIENT
Start: 2024-07-02 | End: 2024-07-05 | Stop reason: HOSPADM

## 2024-07-02 RX ORDER — ADHESIVE BANDAGE
30 BANDAGE TOPICAL 2 TIMES DAILY PRN
Status: DISCONTINUED | OUTPATIENT
Start: 2024-07-03 | End: 2024-07-05 | Stop reason: HOSPADM

## 2024-07-02 RX ORDER — DIPHENOXYLATE HYDROCHLORIDE AND ATROPINE SULFATE 2.5; .025 MG/1; MG/1
2 TABLET ORAL EVERY 6 HOURS PRN
Status: DISCONTINUED | OUTPATIENT
Start: 2024-07-02 | End: 2024-07-05 | Stop reason: HOSPADM

## 2024-07-02 RX ORDER — PHENYLEPHRINE HYDROCHLORIDE 10 MG/ML
INJECTION INTRAVENOUS
Status: DISCONTINUED | OUTPATIENT
Start: 2024-07-02 | End: 2024-07-02

## 2024-07-02 RX ORDER — EPHEDRINE SULFATE 50 MG/ML
INJECTION, SOLUTION INTRAVENOUS
Status: DISCONTINUED | OUTPATIENT
Start: 2024-07-02 | End: 2024-07-02

## 2024-07-02 RX ORDER — SODIUM CHLORIDE 0.9 % (FLUSH) 0.9 %
10 SYRINGE (ML) INJECTION
Status: DISCONTINUED | OUTPATIENT
Start: 2024-07-02 | End: 2024-07-05 | Stop reason: HOSPADM

## 2024-07-02 RX ORDER — IBUPROFEN 600 MG/1
600 TABLET ORAL EVERY 6 HOURS
Status: DISCONTINUED | OUTPATIENT
Start: 2024-07-02 | End: 2024-07-05 | Stop reason: HOSPADM

## 2024-07-02 RX ORDER — CEFAZOLIN SODIUM 2 G/50ML
2 SOLUTION INTRAVENOUS
Status: ACTIVE | OUTPATIENT
Start: 2024-07-02 | End: 2024-07-03

## 2024-07-02 RX ORDER — CARBOPROST TROMETHAMINE 250 UG/ML
250 INJECTION, SOLUTION INTRAMUSCULAR
Status: DISCONTINUED | OUTPATIENT
Start: 2024-07-02 | End: 2024-07-05 | Stop reason: HOSPADM

## 2024-07-02 RX ORDER — ACETAMINOPHEN 10 MG/ML
INJECTION, SOLUTION INTRAVENOUS
Status: DISCONTINUED | OUTPATIENT
Start: 2024-07-02 | End: 2024-07-02

## 2024-07-02 RX ORDER — AMOXICILLIN 250 MG
1 CAPSULE ORAL NIGHTLY PRN
Status: DISCONTINUED | OUTPATIENT
Start: 2024-07-02 | End: 2024-07-05 | Stop reason: HOSPADM

## 2024-07-02 RX ORDER — MISOPROSTOL 100 UG/1
800 TABLET ORAL
Status: DISCONTINUED | OUTPATIENT
Start: 2024-07-02 | End: 2024-07-05 | Stop reason: HOSPADM

## 2024-07-02 RX ORDER — MORPHINE SULFATE 0.5 MG/ML
INJECTION, SOLUTION EPIDURAL; INTRATHECAL; INTRAVENOUS
Status: DISCONTINUED | OUTPATIENT
Start: 2024-07-02 | End: 2024-07-02

## 2024-07-02 RX ORDER — OXYTOCIN-SODIUM CHLORIDE 0.9% IV SOLN 30 UNIT/500ML 30-0.9/5 UT/ML-%
95 SOLUTION INTRAVENOUS ONCE
Status: DISCONTINUED | OUTPATIENT
Start: 2024-07-02 | End: 2024-07-05 | Stop reason: HOSPADM

## 2024-07-02 RX ORDER — HYDROCODONE BITARTRATE AND ACETAMINOPHEN 10; 325 MG/1; MG/1
1 TABLET ORAL EVERY 4 HOURS PRN
Status: DISCONTINUED | OUTPATIENT
Start: 2024-07-02 | End: 2024-07-05 | Stop reason: HOSPADM

## 2024-07-02 RX ORDER — CEFAZOLIN SODIUM 2 G/50ML
2 SOLUTION INTRAVENOUS
Status: COMPLETED | OUTPATIENT
Start: 2024-07-02 | End: 2024-07-02

## 2024-07-02 RX ORDER — METHYLERGONOVINE MALEATE 0.2 MG/ML
200 INJECTION INTRAVENOUS
Status: DISCONTINUED | OUTPATIENT
Start: 2024-07-02 | End: 2024-07-05 | Stop reason: HOSPADM

## 2024-07-02 RX ORDER — MUPIROCIN 20 MG/G
OINTMENT TOPICAL
Status: DISCONTINUED | OUTPATIENT
Start: 2024-07-02 | End: 2024-07-02

## 2024-07-02 RX ORDER — DIPHENHYDRAMINE HCL 25 MG
25 CAPSULE ORAL EVERY 4 HOURS PRN
Status: DISCONTINUED | OUTPATIENT
Start: 2024-07-02 | End: 2024-07-05 | Stop reason: HOSPADM

## 2024-07-02 RX ORDER — METHYLERGONOVINE MALEATE 0.2 MG/ML
200 INJECTION INTRAVENOUS ONCE AS NEEDED
Status: DISCONTINUED | OUTPATIENT
Start: 2024-07-02 | End: 2024-07-05 | Stop reason: HOSPADM

## 2024-07-02 RX ORDER — ONDANSETRON 4 MG/1
8 TABLET, ORALLY DISINTEGRATING ORAL EVERY 8 HOURS PRN
Status: DISCONTINUED | OUTPATIENT
Start: 2024-07-02 | End: 2024-07-05 | Stop reason: HOSPADM

## 2024-07-02 RX ORDER — MORPHINE SULFATE 4 MG/ML
2 INJECTION, SOLUTION INTRAMUSCULAR; INTRAVENOUS
Status: DISCONTINUED | OUTPATIENT
Start: 2024-07-02 | End: 2024-07-05 | Stop reason: HOSPADM

## 2024-07-02 RX ORDER — OXYTOCIN-SODIUM CHLORIDE 0.9% IV SOLN 30 UNIT/500ML 30-0.9/5 UT/ML-%
10 SOLUTION INTRAVENOUS ONCE AS NEEDED
Status: DISCONTINUED | OUTPATIENT
Start: 2024-07-02 | End: 2024-07-05 | Stop reason: HOSPADM

## 2024-07-02 RX ORDER — KETOROLAC TROMETHAMINE 30 MG/ML
30 INJECTION, SOLUTION INTRAMUSCULAR; INTRAVENOUS EVERY 6 HOURS
Status: DISCONTINUED | OUTPATIENT
Start: 2024-07-03 | End: 2024-07-02

## 2024-07-02 RX ORDER — OXYTOCIN-SODIUM CHLORIDE 0.9% IV SOLN 30 UNIT/500ML 30-0.9/5 UT/ML-%
10 SOLUTION INTRAVENOUS ONCE
Status: COMPLETED | OUTPATIENT
Start: 2024-07-02 | End: 2024-07-02

## 2024-07-02 RX ORDER — TRANEXAMIC ACID 100 MG/ML
INJECTION, SOLUTION INTRAVENOUS
Status: DISCONTINUED | OUTPATIENT
Start: 2024-07-02 | End: 2024-07-02

## 2024-07-02 RX ORDER — SODIUM CHLORIDE, SODIUM LACTATE, POTASSIUM CHLORIDE, CALCIUM CHLORIDE 600; 310; 30; 20 MG/100ML; MG/100ML; MG/100ML; MG/100ML
INJECTION, SOLUTION INTRAVENOUS CONTINUOUS
Status: DISCONTINUED | OUTPATIENT
Start: 2024-07-02 | End: 2024-07-05 | Stop reason: HOSPADM

## 2024-07-02 RX ORDER — DEXAMETHASONE SODIUM PHOSPHATE 4 MG/ML
INJECTION, SOLUTION INTRA-ARTICULAR; INTRALESIONAL; INTRAMUSCULAR; INTRAVENOUS; SOFT TISSUE
Status: DISCONTINUED | OUTPATIENT
Start: 2024-07-02 | End: 2024-07-02

## 2024-07-02 RX ORDER — PRENATAL WITH FERROUS FUM AND FOLIC ACID 3080; 920; 120; 400; 22; 1.84; 3; 20; 10; 1; 12; 200; 27; 25; 2 [IU]/1; [IU]/1; MG/1; [IU]/1; MG/1; MG/1; MG/1; MG/1; MG/1; MG/1; UG/1; MG/1; MG/1; MG/1; MG/1
1 TABLET ORAL DAILY
Status: DISCONTINUED | OUTPATIENT
Start: 2024-07-03 | End: 2024-07-05 | Stop reason: HOSPADM

## 2024-07-02 RX ORDER — OXYTOCIN 10 [USP'U]/ML
10 INJECTION, SOLUTION INTRAMUSCULAR; INTRAVENOUS ONCE AS NEEDED
Status: DISCONTINUED | OUTPATIENT
Start: 2024-07-02 | End: 2024-07-05 | Stop reason: HOSPADM

## 2024-07-02 RX ORDER — SIMETHICONE 80 MG
1 TABLET,CHEWABLE ORAL EVERY 6 HOURS PRN
Status: DISCONTINUED | OUTPATIENT
Start: 2024-07-02 | End: 2024-07-05 | Stop reason: HOSPADM

## 2024-07-02 RX ORDER — FENTANYL CITRATE 50 UG/ML
INJECTION, SOLUTION INTRAMUSCULAR; INTRAVENOUS
Status: DISCONTINUED | OUTPATIENT
Start: 2024-07-02 | End: 2024-07-02

## 2024-07-02 RX ORDER — FAMOTIDINE 10 MG/ML
20 INJECTION INTRAVENOUS
Status: DISCONTINUED | OUTPATIENT
Start: 2024-07-02 | End: 2024-07-05 | Stop reason: HOSPADM

## 2024-07-02 RX ORDER — BUPIVACAINE HYDROCHLORIDE 7.5 MG/ML
INJECTION, SOLUTION EPIDURAL; RETROBULBAR
Status: COMPLETED | OUTPATIENT
Start: 2024-07-02 | End: 2024-07-02

## 2024-07-02 RX ORDER — KETOROLAC TROMETHAMINE 30 MG/ML
30 INJECTION, SOLUTION INTRAMUSCULAR; INTRAVENOUS EVERY 6 HOURS
Status: COMPLETED | OUTPATIENT
Start: 2024-07-02 | End: 2024-07-03

## 2024-07-02 RX ORDER — DOCUSATE SODIUM 100 MG/1
200 CAPSULE, LIQUID FILLED ORAL 2 TIMES DAILY
Status: DISCONTINUED | OUTPATIENT
Start: 2024-07-02 | End: 2024-07-05 | Stop reason: HOSPADM

## 2024-07-02 RX ORDER — MUPIROCIN 20 MG/G
OINTMENT TOPICAL 2 TIMES DAILY
Status: DISCONTINUED | OUTPATIENT
Start: 2024-07-02 | End: 2024-07-05 | Stop reason: HOSPADM

## 2024-07-02 RX ORDER — OXYTOCIN-SODIUM CHLORIDE 0.9% IV SOLN 30 UNIT/500ML 30-0.9/5 UT/ML-%
95 SOLUTION INTRAVENOUS ONCE
Status: COMPLETED | OUTPATIENT
Start: 2024-07-02 | End: 2024-07-02

## 2024-07-02 RX ORDER — ONDANSETRON HYDROCHLORIDE 2 MG/ML
INJECTION, SOLUTION INTRAVENOUS
Status: DISCONTINUED | OUTPATIENT
Start: 2024-07-02 | End: 2024-07-02

## 2024-07-02 RX ADMIN — Medication 30 UNITS: at 03:07

## 2024-07-02 RX ADMIN — EPHEDRINE SULFATE 5 MG: 50 INJECTION, SOLUTION INTRAVENOUS at 03:07

## 2024-07-02 RX ADMIN — ONDANSETRON 8 MG: 2 INJECTION INTRAMUSCULAR; INTRAVENOUS at 03:07

## 2024-07-02 RX ADMIN — PHENYLEPHRINE HYDROCHLORIDE 100 MCG: 10 INJECTION INTRAVENOUS at 03:07

## 2024-07-02 RX ADMIN — SODIUM CHLORIDE, POTASSIUM CHLORIDE, SODIUM LACTATE AND CALCIUM CHLORIDE: 600; 310; 30; 20 INJECTION, SOLUTION INTRAVENOUS at 03:07

## 2024-07-02 RX ADMIN — FENTANYL CITRATE 10 MCG: 50 INJECTION, SOLUTION INTRAMUSCULAR; INTRAVENOUS at 03:07

## 2024-07-02 RX ADMIN — SODIUM CITRATE AND CITRIC ACID MONOHYDRATE 30 ML: 500; 334 SOLUTION ORAL at 02:07

## 2024-07-02 RX ADMIN — EPHEDRINE SULFATE 10 MG: 50 INJECTION, SOLUTION INTRAVENOUS at 03:07

## 2024-07-02 RX ADMIN — BUPIVACAINE HYDROCHLORIDE 1.7 ML: 7.5 INJECTION, SOLUTION EPIDURAL; RETROBULBAR at 03:07

## 2024-07-02 RX ADMIN — ACETAMINOPHEN 1000 MG: 10 INJECTION, SOLUTION INTRAVENOUS at 03:07

## 2024-07-02 RX ADMIN — SODIUM CHLORIDE, POTASSIUM CHLORIDE, SODIUM LACTATE AND CALCIUM CHLORIDE 1000 ML: 600; 310; 30; 20 INJECTION, SOLUTION INTRAVENOUS at 01:07

## 2024-07-02 RX ADMIN — EPHEDRINE SULFATE 25 MG: 50 INJECTION INTRAVENOUS at 03:07

## 2024-07-02 RX ADMIN — PHENYLEPHRINE HYDROCHLORIDE 100 MCG: 10 INJECTION INTRAVENOUS at 04:07

## 2024-07-02 RX ADMIN — MORPHINE SULFATE 2 MG: 4 INJECTION INTRAVENOUS at 06:07

## 2024-07-02 RX ADMIN — CEFAZOLIN SODIUM 2 G: 2 SOLUTION INTRAVENOUS at 03:07

## 2024-07-02 RX ADMIN — Medication 95 MILLI-UNITS/MIN: at 05:07

## 2024-07-02 RX ADMIN — TRANEXAMIC ACID 1000 MG: 100 INJECTION, SOLUTION INTRAVENOUS at 03:07

## 2024-07-02 RX ADMIN — KETOROLAC TROMETHAMINE 30 MG: 30 INJECTION, SOLUTION INTRAMUSCULAR at 10:07

## 2024-07-02 RX ADMIN — DEXAMETHASONE SODIUM PHOSPHATE 4 MG: 4 INJECTION, SOLUTION INTRA-ARTICULAR; INTRALESIONAL; INTRAMUSCULAR; INTRAVENOUS; SOFT TISSUE at 03:07

## 2024-07-02 RX ADMIN — KETOROLAC TROMETHAMINE 30 MG: 30 INJECTION, SOLUTION INTRAMUSCULAR; INTRAVENOUS at 04:07

## 2024-07-02 RX ADMIN — MORPHINE SULFATE 0.1 MG: 0.5 INJECTION, SOLUTION EPIDURAL; INTRATHECAL; INTRAVENOUS at 03:07

## 2024-07-02 RX ADMIN — SODIUM CHLORIDE, POTASSIUM CHLORIDE, SODIUM LACTATE AND CALCIUM CHLORIDE: 600; 310; 30; 20 INJECTION, SOLUTION INTRAVENOUS at 02:07

## 2024-07-02 NOTE — TRANSFER OF CARE
"Anesthesia Transfer of Care Note    Patient: Tori Bojorquez    Procedure(s) Performed: Procedure(s) (LRB):   SECTION, WITH TUBAL LIGATION (N/A)    Patient location: Labor and Delivery    Anesthesia Type: spinal    Transport from OR: Transported from OR on room air with adequate spontaneous ventilation    Post pain: adequate analgesia    Post assessment: no apparent anesthetic complications and tolerated procedure well    Post vital signs: stable    Level of consciousness: alert, awake and oriented    Nausea/Vomiting: no nausea/vomiting    Complications: none    Transfer of care protocol was followed      Last vitals: Visit Vitals  /64   Pulse 76   Temp 36.7 °C (98.1 °F)   Resp 18   Ht 5' 4" (1.626 m)   Wt 104.3 kg (230 lb)   LMP 10/16/2023 (Approximate)   SpO2 98%   Breastfeeding No   BMI 39.48 kg/m²     "

## 2024-07-02 NOTE — ANESTHESIA PREPROCEDURE EVALUATION
07/02/2024  Tori Bojorquez is a 33 y.o., female.    PLT 179k  Repeat CS, ctxs  Cereal bar at 7a, 6h NPO time    Pre-op Assessment    I have reviewed the Patient Summary Reports.     I have reviewed the Nursing Notes. I have reviewed the NPO Status.   I have reviewed the Medications.     Review of Systems  Anesthesia Hx:               Denies Personal Hx of Anesthesia complications.                    Cardiovascular:  Exercise tolerance: good                                           Pulmonary:  Pulmonary Normal                       Neurological:  Neurology Normal                                      Endocrine:        Obesity / BMI > 30      Physical Exam  General: Well nourished, Cooperative and Alert    Airway:  Mallampati: I         Anesthesia Plan  Type of Anesthesia, risks & benefits discussed:    Anesthesia Type: Spinal  Intra-op Monitoring Plan: Standard ASA Monitors  Post Op Pain Control Plan: intrathecal opioid  Informed Consent: Informed consent signed with the Patient and all parties understand the risks and agree with anesthesia plan.  All questions answered.   ASA Score: 2  Day of Surgery Review of History & Physical: H&P Update referred to the surgeon/provider.    Ready For Surgery From Anesthesia Perspective.     .

## 2024-07-02 NOTE — H&P
[facility]                       [address] [id number]    PATIENT NAME:       RAF BOJORQUEZ   YOB: 1990  CSN:                [id number]   MRN:                [id number]  ADMIT DATE:         2024 10:29:00  PHYSICIAN:          Nader Sanchez MD                        HISTORY AND PHYSICAL      HISTORY OF PRESENT ILLNESS:  Ms. Bojorquez is a 33-year-old female,  4, para   3, last menstrual period 2023, EDC is 2024.  Estimated   gestational age is 38 and 4/7 weeks of gestation.    PAST SURGICAL HISTORY:  The patient had;  1.  section.  2. Cholecystectomy.  3. Tonsils and adenoidectomy.  4. Ear tubes.  5. Cervical cerclage.    ALLERGIES:  SHE IS ALLERGIC TO BACTRIM.     SOCIAL HISTORY:  Negative.    MEDICATIONS:  Include;  1. Prenatal vitamins.  2. Aspirin.    PHYSICAL EXAM:  HEAD, EARS, EYES, NOSE AND THROAT:  Within normal limits.  CHEST:  Clear A to P.  HEART:  Normal sinus rhythm.  ABDOMEN:  Gravid.  PELVIC:  The cervix is closed, bag water is intact.  Presenting part is vertex.    There is no vaginal bleeding.  She is neeraj every 5 to 10 minutes on the   tocodynamometer.  She is somewhat effaced.  EXTREMITIES:  Within normal limits.  NEUROLOGIC:  Cranial nerves 2 through 12 are grossly intact.    IMPRESSION:  Previous  sections, labor, cervical incompetency, cerclage   stitch, undesired fertility.    PLAN:  A repeat  section, bilateral tubal ligation, and removal of   cerclage stitch.  The patient has read and signed the informed consents.        ______________________________  Nader Sanchez MD    DCR/AQS  DD:  2024  Time:  03:04PM  DT:  2024  Time:  03:21PM  Job #:  [id number]    cc:   Fax: [phone] Fax: [phone]        HISTORY AND PHYSICAL

## 2024-07-02 NOTE — ANESTHESIA PROCEDURE NOTES
Spinal    Diagnosis:   Patient location during procedure: OB  Start time: 2024 3:05 PM  Timeout: 2024 3:05 PM  End time: 2024 3:10 PM    Staffing  Authorizing Provider: Bal Kwan MD  Performing Provider: Karol Keane CRNA    Staffing  Performed by: Karol Keane CRNA  Authorized by: Bal Kwan MD    Preanesthetic Checklist  Completed: patient identified, IV checked, site marked, risks and benefits discussed, surgical consent, monitors and equipment checked, pre-op evaluation and timeout performed  Spinal Block  Patient position: sitting  Prep: ChloraPrep  Patient monitoring: heart rate, frequent blood pressure checks, cardiac monitor and continuous pulse ox  Approach: midline  Location: L3-4  Injection technique: single shot  CSF Fluid: clear free-flowing CSF  Needle  Needle type: pencil-tip   Needle gauge: 25 G  Needle length: 3.5 in  Additional Documentation: incremental injection, negative aspiration for heme and no paresthesia on injection  Needle localization: anatomical landmarks  Assessment  Sensory level: T4   Dermatomal levels determined by ice  Ease of block: easy  Patient's tolerance of the procedure: comfortable throughout block and no complaints  Medications:    Medications: bupivacaine (pf) (MARCAINE) injection 0.75% - Intraspinal   1.7 mL - 2024 3:10:00 PM

## 2024-07-02 NOTE — OP NOTE
[facility]                       [address] [id number]    PATIENT NAME:      RAF CANDELARIA   YOB: 1990  CSN:               [id number]  MRN:               [id number]  ADMIT DATE:        2024 10:29:00  PHYSICIAN:         Nader Sanchez MD                          OPERATIVE REPORT      DATE OF SURGERY:    2024 00:00:00    SURGEON:  Nader Sanchez MD    PREOPERATIVE DIAGNOSES:  A 38 and 3/7th week intrauterine pregnancy, previous    section x3, undesired fertility, cervical incompetency.    POSTOPERATIVE DIAGNOSES:  A 38 and 3/7th week intrauterine pregnancy, previous    section x3, undesired fertility, cervical incompetency.    OPERATIVE PROCEDURE PERFORMED:    1. Repeat low transverse  section #1.  2. Bilateral tubal ligation.  3. Removal of cerclage stitch.    ASSISTANT SURGEON:  Dr. Lopez.    BLOOD LOSS:  700 mL.    ANESTHETIC AGENT:  Spinal.    TECHNIQUE:  After all risks, benefits, alternative therapies were offered to the   patient and the family and informed consents were signed and the patient had a   nonreactive nonstress test and she was neeraj.  It was elected to perform   repeat low transverse  section at this gestational age.  Therefore, she   was brought to the operative suite at [facility], placed   in the sitting position and given a spinal anesthetic agent.  Next, she was   placed in supine position and prepped and draped in usual sterile fashion.  With   the first scalpel, a transverse Pfannenstiel skin incision was performed.  With   the second scalpel, the depth of the incision was taken down to the fascial   layers.  The fascia was nicked transversely and gently dissected off the rectum.    Rectus muscle cephalad and caudad.  The rectus muscles were split at the   midline, followed by elevation of the  peritoneum with pickups with teeth x2.    The vertical peritoneal incision was performed.  The lower uterine segment   transverse serosal incision was performed.  The lower uterine segment myometrial   incision was performed.  There was great care and delivery of the infant's   head.  There was prompt suctioning of the oral nasopharynx, followed by   application of fundal pressure.  After the subsequent delivery of the body of   the infant, the cord was clamped x2 and excised.  Cord blood was obtained.  The   placenta was removed and sent to pathology for histopathologic diagnoses.  There   was removal of all membranous material from the uterine cavity.  There was   closure of the lower uterine segment myometrial incision with 0 Vicryl suture   material.  Imbrication of the initial closure line was done with the same suture   material.  The pericolic gutters were checked and cleaned.  There was noted to   be normal ovaries and tubes bilaterally.  Bilateral tubal ligation was performed   by grasping the ampullary segment of each fallopian tube and entering the   mesosalpinx, excised 1.5 cm segment of each fallopian tube.  The pedicles that   were created were ligated with 0 silk ties and cauterized.  The anatomy was   placed back into its proper anatomical position.  Copious irrigation of the   abdominal peritoneal cavity until clear was done.  There was removal of all   surgical instrumentation.  Correct count was noted x2.  Seprafilm was placed   over the lower uterine segment.  There was closure of the anterior peritoneum   with 2-0 Vicryl suture material.  The rectus muscles were reapposed with   figure-of-eight sutures of 2-0 Vicryl suture material.  The fascia was closed   with #1 Vicryl suture material.  Subcutaneous fat was closed with 2-0 Vicryl   suture material.  The skin was closed with skin clips and AquaFilm bandage seal   was placed.  At this time, the patient was repositioned for removal of the    cerclage stitch.  The patient was placed in dorsal lithotomy position in   stirrups.  I was able to grasp the tail of the cerclage stitch and cut one of   the lower legs with easy removal of the cerclage stitch.  It was sent to   pathology for pictographic representation.  There was no excessive bleeding.    There was removal of all surgical instrumentation.  Correct count was noted.    The patient was discharged to postpartum for normal postpartum care.        ______________________________  Nader Sanchez MD    DCR/AQS  DD:  07/02/2024  Time:  04:48PM  DT:  07/02/2024  Time:  05:23PM  Job #:  [id number]    cc:   [phone]        Sanket Russo MD        OPERATIVE REPORT

## 2024-07-03 LAB
BASOPHILS # BLD AUTO: 0.02 X10(3)/MCL
BASOPHILS NFR BLD AUTO: 0.1 %
EOSINOPHIL # BLD AUTO: 0.02 X10(3)/MCL (ref 0–0.9)
EOSINOPHIL NFR BLD AUTO: 0.1 %
ERYTHROCYTE [DISTWIDTH] IN BLOOD BY AUTOMATED COUNT: 13.4 % (ref 11.5–17)
HCT VFR BLD AUTO: 29.9 % (ref 37–47)
HGB BLD-MCNC: 10.2 G/DL (ref 12–16)
IMM GRANULOCYTES # BLD AUTO: 0.1 X10(3)/MCL (ref 0–0.04)
IMM GRANULOCYTES NFR BLD AUTO: 0.7 %
LYMPHOCYTES # BLD AUTO: 2.33 X10(3)/MCL (ref 0.6–4.6)
LYMPHOCYTES NFR BLD AUTO: 16 %
MCH RBC QN AUTO: 29.9 PG (ref 27–31)
MCHC RBC AUTO-ENTMCNC: 34.1 G/DL (ref 33–36)
MCV RBC AUTO: 87.7 FL (ref 80–94)
MONOCYTES # BLD AUTO: 0.64 X10(3)/MCL (ref 0.1–1.3)
MONOCYTES NFR BLD AUTO: 4.4 %
NEUTROPHILS # BLD AUTO: 11.43 X10(3)/MCL (ref 2.1–9.2)
NEUTROPHILS NFR BLD AUTO: 78.7 %
NRBC BLD AUTO-RTO: 0 %
PLATELET # BLD AUTO: 152 X10(3)/MCL (ref 130–400)
PMV BLD AUTO: 9.8 FL (ref 7.4–10.4)
RBC # BLD AUTO: 3.41 X10(6)/MCL (ref 4.2–5.4)
WBC # BLD AUTO: 14.54 X10(3)/MCL (ref 4.5–11.5)

## 2024-07-03 PROCEDURE — 85025 COMPLETE CBC W/AUTO DIFF WBC: CPT | Performed by: OBSTETRICS & GYNECOLOGY

## 2024-07-03 PROCEDURE — 36415 COLL VENOUS BLD VENIPUNCTURE: CPT | Performed by: OBSTETRICS & GYNECOLOGY

## 2024-07-03 PROCEDURE — 25000003 PHARM REV CODE 250: Performed by: OBSTETRICS & GYNECOLOGY

## 2024-07-03 PROCEDURE — 11000001 HC ACUTE MED/SURG PRIVATE ROOM

## 2024-07-03 PROCEDURE — 63600175 PHARM REV CODE 636 W HCPCS: Performed by: OBSTETRICS & GYNECOLOGY

## 2024-07-03 RX ORDER — CEFAZOLIN SODIUM 2 G/50ML
2 SOLUTION INTRAVENOUS
Status: COMPLETED | OUTPATIENT
Start: 2024-07-03 | End: 2024-07-03

## 2024-07-03 RX ADMIN — DOCUSATE SODIUM 200 MG: 100 CAPSULE, LIQUID FILLED ORAL at 10:07

## 2024-07-03 RX ADMIN — HYDROCODONE BITARTRATE AND ACETAMINOPHEN 1 TABLET: 10; 325 TABLET ORAL at 07:07

## 2024-07-03 RX ADMIN — DOCUSATE SODIUM 200 MG: 100 CAPSULE, LIQUID FILLED ORAL at 09:07

## 2024-07-03 RX ADMIN — CEFAZOLIN SODIUM 2 G: 2 SOLUTION INTRAVENOUS at 09:07

## 2024-07-03 RX ADMIN — HYDROCODONE BITARTRATE AND ACETAMINOPHEN 1 TABLET: 10; 325 TABLET ORAL at 11:07

## 2024-07-03 RX ADMIN — IBUPROFEN 600 MG: 600 TABLET, FILM COATED ORAL at 04:07

## 2024-07-03 RX ADMIN — PRENATAL VITAMINS-IRON FUMARATE 27 MG IRON-FOLIC ACID 0.8 MG TABLET 1 TABLET: at 10:07

## 2024-07-03 RX ADMIN — CEFAZOLIN SODIUM 2 G: 2 SOLUTION INTRAVENOUS at 10:07

## 2024-07-03 RX ADMIN — KETOROLAC TROMETHAMINE 30 MG: 30 INJECTION, SOLUTION INTRAMUSCULAR at 10:07

## 2024-07-03 RX ADMIN — KETOROLAC TROMETHAMINE 30 MG: 30 INJECTION, SOLUTION INTRAMUSCULAR at 04:07

## 2024-07-03 RX ADMIN — IBUPROFEN 600 MG: 600 TABLET, FILM COATED ORAL at 09:07

## 2024-07-03 RX ADMIN — HYDROCODONE BITARTRATE AND ACETAMINOPHEN 1 TABLET: 10; 325 TABLET ORAL at 04:07

## 2024-07-03 NOTE — PROGRESS NOTES
[facility]                       [address] [id number]    PATIENT NAME:       RAF BOJORQUEZ   YOB: 1990  CSN:                [id number]   MRN:                [id number]  ADMIT DATE:         2024 10:29:00  PHYSICIAN:          Nader Sanchez MD                            PROGRESS NOTE    DATE:      Ms. Bojorquez is postop day #1 status post  section.  She is doing well.    She is normotensive.  She is afebrile.  She denies any major complaints.  Her   incision site is within normal limits.  The uterus is firm.  There is scant   lochia.    IMPRESSION:  Postop day #1.    PLAN:  To continue the present management. Complete the IV antibiotic therapy   and have the patient ambulate.        ______________________________  Nader Sanchez MD    DCR/AQS  DD:  2024  Time:  01:28PM  DT:  2024  Time:  01:34PM  Job #:  [id number]      PROGRESS NOTE

## 2024-07-03 NOTE — ANESTHESIA POSTPROCEDURE EVALUATION
Anesthesia Post Evaluation    Patient: Tori Bojorquez    Procedure(s) Performed: Procedure(s) (LRB):   SECTION, WITH TUBAL LIGATION (N/A)    Final Anesthesia Type: spinal      Patient location during evaluation: floor  Patient participation: Yes- Able to Participate  Level of consciousness: awake and oriented  Post-procedure vital signs: reviewed and stable  Pain management: adequate  Airway patency: patent    PONV status at discharge: No PONV  Anesthetic complications: no      Cardiovascular status: hemodynamically stable  Respiratory status: unassisted and spontaneous ventilation  Hydration status: euvolemic  Follow-up not needed.        NEURO: Neuraxial block resolved      Vitals Value Taken Time   BP 97/60 24 0805   Temp 36.8 °C (98.3 °F) 24 0805   Pulse 75 24 0805   Resp 18 24 0746   SpO2 97 % 24 0513         Event Time   Out of Recovery 17:25:00         Pain/Lisa Score: Pain Rating Prior to Med Admin: 10 (7/3/2024 10:20 AM)  Lisa Score: 9 (2024  5:10 PM)

## 2024-07-04 PROCEDURE — 11000001 HC ACUTE MED/SURG PRIVATE ROOM

## 2024-07-04 PROCEDURE — 25000003 PHARM REV CODE 250: Performed by: OBSTETRICS & GYNECOLOGY

## 2024-07-04 RX ADMIN — MAGNESIUM HYDROXIDE 2400 MG: 400 SUSPENSION ORAL at 08:07

## 2024-07-04 RX ADMIN — IBUPROFEN 600 MG: 600 TABLET, FILM COATED ORAL at 10:07

## 2024-07-04 RX ADMIN — HYDROCODONE BITARTRATE AND ACETAMINOPHEN 1 TABLET: 10; 325 TABLET ORAL at 06:07

## 2024-07-04 RX ADMIN — HYDROCODONE BITARTRATE AND ACETAMINOPHEN 1 TABLET: 10; 325 TABLET ORAL at 11:07

## 2024-07-04 RX ADMIN — DOCUSATE SODIUM 200 MG: 100 CAPSULE, LIQUID FILLED ORAL at 09:07

## 2024-07-04 RX ADMIN — MAGNESIUM HYDROXIDE 2400 MG: 400 SUSPENSION ORAL at 02:07

## 2024-07-04 RX ADMIN — PRENATAL VITAMINS-IRON FUMARATE 27 MG IRON-FOLIC ACID 0.8 MG TABLET 1 TABLET: at 08:07

## 2024-07-04 RX ADMIN — IBUPROFEN 600 MG: 600 TABLET, FILM COATED ORAL at 04:07

## 2024-07-04 RX ADMIN — IBUPROFEN 600 MG: 600 TABLET, FILM COATED ORAL at 09:07

## 2024-07-04 RX ADMIN — DOCUSATE SODIUM 200 MG: 100 CAPSULE, LIQUID FILLED ORAL at 08:07

## 2024-07-04 NOTE — PROGRESS NOTES
[facility]                       [address] [id number]    PATIENT NAME:       RAF CANDELARIA   YOB: 1990  CSN:                [id number]   MRN:                [id number]  ADMIT DATE:         2024 10:29:00  PHYSICIAN:          Nader Sanchez MD                            PROGRESS NOTE    DATE:      SUBJECTIVE:  She is 72 hours postop .  She is doing well.  She is   normotensive.  She is afebrile.  Her incision site is within normal limits.  She   has scant lochia and uterus is firm.  Blood type is B negative.  RhoGAM has   been ordered per protocol.    PLAN:  To discharge to home tomorrow evening with a followup in office on Monday   for staple removal.        ______________________________  Nader Sanchez MD    DCR/AQS  DD:  2024  Time:  02:29PM  DT:  2024  Time:  03:26PM  Job #:  [id number]      PROGRESS NOTE

## 2024-07-04 NOTE — PLAN OF CARE
Problem: Adult Inpatient Plan of Care  Goal: Plan of Care Review  Outcome: Progressing  Goal: Patient-Specific Goal (Individualized)  Outcome: Progressing  Goal: Absence of Hospital-Acquired Illness or Injury  Outcome: Progressing  Goal: Optimal Comfort and Wellbeing  Outcome: Progressing  Goal: Readiness for Transition of Care  Outcome: Progressing     Problem:  Fall Injury Risk  Goal: Absence of Fall, Infant Drop and Related Injury  Outcome: Progressing     Problem: Infection  Goal: Absence of Infection Signs and Symptoms  Outcome: Progressing     Problem: Wound  Goal: Optimal Coping  Outcome: Progressing  Goal: Optimal Functional Ability  Outcome: Progressing  Goal: Absence of Infection Signs and Symptoms  Outcome: Progressing  Goal: Improved Oral Intake  Outcome: Progressing  Goal: Optimal Pain Control and Function  Outcome: Progressing  Goal: Skin Health and Integrity  Outcome: Progressing  Goal: Optimal Wound Healing  Outcome: Progressing     Problem: Postpartum ( Delivery)  Goal: Successful Parent Role Transition  Outcome: Progressing  Goal: Hemostasis  Outcome: Progressing  Goal: Effective Bowel Elimination  Outcome: Progressing  Goal: Fluid and Electrolyte Balance  Outcome: Progressing  Goal: Absence of Infection Signs and Symptoms  Outcome: Progressing  Goal: Anesthesia/Sedation Recovery  Outcome: Progressing  Goal: Optimal Pain Control and Function  Outcome: Progressing  Goal: Effective Oxygenation and Ventilation  Outcome: Progressing

## 2024-07-05 VITALS
HEIGHT: 64 IN | BODY MASS INDEX: 39.27 KG/M2 | TEMPERATURE: 98 F | RESPIRATION RATE: 16 BRPM | OXYGEN SATURATION: 99 % | SYSTOLIC BLOOD PRESSURE: 122 MMHG | WEIGHT: 230 LBS | HEART RATE: 78 BPM | DIASTOLIC BLOOD PRESSURE: 78 MMHG

## 2024-07-05 LAB — PSYCHE PATHOLOGY RESULT: NORMAL

## 2024-07-05 PROCEDURE — 25000003 PHARM REV CODE 250: Performed by: OBSTETRICS & GYNECOLOGY

## 2024-07-05 RX ADMIN — HYDROCODONE BITARTRATE AND ACETAMINOPHEN 1 TABLET: 10; 325 TABLET ORAL at 12:07

## 2024-07-05 RX ADMIN — HYDROCODONE BITARTRATE AND ACETAMINOPHEN 1 TABLET: 10; 325 TABLET ORAL at 05:07

## 2024-07-05 RX ADMIN — PRENATAL VITAMINS-IRON FUMARATE 27 MG IRON-FOLIC ACID 0.8 MG TABLET 1 TABLET: at 08:07

## 2024-07-05 RX ADMIN — DOCUSATE SODIUM 200 MG: 100 CAPSULE, LIQUID FILLED ORAL at 08:07

## 2024-07-05 RX ADMIN — HYDROCODONE BITARTRATE AND ACETAMINOPHEN 1 TABLET: 10; 325 TABLET ORAL at 02:07

## 2024-07-05 RX ADMIN — BISACODYL 10 MG: 10 SUPPOSITORY RECTAL at 12:07

## 2024-07-05 RX ADMIN — IBUPROFEN 600 MG: 600 TABLET, FILM COATED ORAL at 09:07

## 2024-07-05 RX ADMIN — IBUPROFEN 600 MG: 600 TABLET, FILM COATED ORAL at 03:07

## 2024-07-05 NOTE — CONSULTS
LMSW consulted to assess for resource needs and make referrals as appropriate. Mom presented in postpartum room with her children ages 14yo, 9yo, 16mo, and . Mom had appropriate affect and was agreeable to assessment at this time. Mom verified demographic information. Mom wishes to formula feed infant and has WIC, SNAP, a car seat and multiple places for safe sleep. FOB arrived during assessment and interactions were appropriate at this time. Parents have reliable transportation for discharge and medical appts. Parents reported having social support for help at home. Mom denied any hx of mental health needs or substance use and reported feeling safe at home from any abuse or harm. Encouraged Mom to seek medical intervention should she find it appropriate for any increased mental health needs. Postpartum resource packet provided including information on [facility] hotline, PPD/PPA, car seat safety, safe sleep practices, domestic violence resources and other parenting resources. Mom verbalized understanding and denied any further social or resource needs at this time.     Infant name: Anna Jeansome  OB: Dr. aSnchez  Pediatrician: Dr. Denise Nguyen at [facility]

## 2024-07-05 NOTE — DISCHARGE SUMMARY
[facility]                       [address] [id number]    PATIENT NAME:       RAF BOJORQUEZ   YOB: 1990  CSN:                [id number]   MRN:                [id number]  ADMIT DATE:         2024 10:29:00  PHYSICIAN:          Nader Sanchez MD                          DISCHARGE SUMMARY    DATE OF DISCHARGE:  2024 00:00:00    Ms. Bojorquez is a patient, who is a 33-year-old female, who has presented at 38   and 3/7th weeks of gestation, previous  section x3, in labor.  She   requested repeat  section, cerclage stitch removal, and tubal ligation.    She underwent those procedures.  She did well intraop as well as postop.  She   is normotensive.  She is afebrile.  She has had a bowel movement.  Her incision   site is within normal limits.  Her blood type is Rh negative.  The infant's   blood type is Rh negative.  RhoGAM was ordered per protocol.  She will be   discharged to home and follow up in the office in 3 days for staple removal.    She has an Aquacel bandage on at present.        ______________________________  Nader Sanchez MD    DCR/AQS  DD:  2024  Time:  04:30PM  DT:  2024  Time:  04:57PM  Job #:  [id number]    cc:   Fax #[phone]        Fax #[phone]        DISCHARGE SUMMARY

## 2024-07-05 NOTE — PLAN OF CARE
Problem: Adult Inpatient Plan of Care  Goal: Plan of Care Review  Outcome: Met  Goal: Patient-Specific Goal (Individualized)  Outcome: Met  Goal: Absence of Hospital-Acquired Illness or Injury  Outcome: Met  Goal: Optimal Comfort and Wellbeing  Outcome: Met  Goal: Readiness for Transition of Care  Outcome: Met     Problem:  Fall Injury Risk  Goal: Absence of Fall, Infant Drop and Related Injury  Outcome: Met     Problem: Infection  Goal: Absence of Infection Signs and Symptoms  Outcome: Met     Problem: Wound  Goal: Optimal Coping  Outcome: Met  Goal: Optimal Functional Ability  Outcome: Met  Goal: Absence of Infection Signs and Symptoms  Outcome: Met  Goal: Improved Oral Intake  Outcome: Met  Goal: Optimal Pain Control and Function  Outcome: Met  Goal: Skin Health and Integrity  Outcome: Met  Goal: Optimal Wound Healing  Outcome: Met     Problem: Postpartum ( Delivery)  Goal: Successful Parent Role Transition  Outcome: Met  Goal: Hemostasis  Outcome: Met  Goal: Effective Bowel Elimination  Outcome: Met  Goal: Fluid and Electrolyte Balance  Outcome: Met  Goal: Absence of Infection Signs and Symptoms  Outcome: Met  Goal: Anesthesia/Sedation Recovery  Outcome: Met  Goal: Optimal Pain Control and Function  Outcome: Met  Goal: Effective Oxygenation and Ventilation  Outcome: Met

## 2024-07-06 LAB
ABO + RH BLD: NORMAL
ABO + RH BLD: NORMAL
BLD PROD TYP BPU: NORMAL
BLD PROD TYP BPU: NORMAL
BLOOD UNIT EXPIRATION DATE: NORMAL
BLOOD UNIT EXPIRATION DATE: NORMAL
BLOOD UNIT TYPE CODE: 1700
BLOOD UNIT TYPE CODE: 1700
CROSSMATCH INTERPRETATION: NORMAL
CROSSMATCH INTERPRETATION: NORMAL
DISPENSE STATUS: NORMAL
DISPENSE STATUS: NORMAL
UNIT NUMBER: NORMAL
UNIT NUMBER: NORMAL

## (undated) DEVICE — STAPLER SKIN COUNT 35 W STPL

## (undated) DEVICE — Device

## (undated) DEVICE — DRAPE UND BUTT W/POUCH 40X44IN

## (undated) DEVICE — GAUZE VISTEC XR DTECT 16 4X4IN

## (undated) DEVICE — SUT VICRYL PLUS 1 CTX 36IN

## (undated) DEVICE — TIP SUCTION YANKAUER

## (undated) DEVICE — SEE MEDLINE ITEM 157117

## (undated) DEVICE — GOWN POLY REINF BRTH SLV XL

## (undated) DEVICE — SPONGE LAP STRL 18X18IN

## (undated) DEVICE — BULB SYRINGE EAR IRRIGATION

## (undated) DEVICE — SOL WATER STRL IRR 1000ML

## (undated) DEVICE — HANDLE DEVON RIGID OR LIGHT

## (undated) DEVICE — PAD PREP CUFFED NS 24X48IN

## (undated) DEVICE — PAD CURITY MATERNITY PERI

## (undated) DEVICE — SYR BULB IRRIG ST 60 LF

## (undated) DEVICE — BOWL UTILITY BLUE 32OZ

## (undated) DEVICE — GOWN X-LG STERILE BACK

## (undated) DEVICE — STAPLER SKIN PROXIMATE REG

## (undated) DEVICE — PAD UNDERPAD 30X30

## (undated) DEVICE — SUT ETHIBOND XTRA 5 V-37 GR

## (undated) DEVICE — TOWEL OR DISP STRL BLUE 4/PK

## (undated) DEVICE — TUBE SUCTION MEDI-VAC STERILE

## (undated) DEVICE — DRAPE LEGGINGS CUFF 31X48IN

## (undated) DEVICE — TRAY CATH FOL SIL DRN BAG 16FR

## (undated) DEVICE — PAD SANITARY OB STERILE

## (undated) DEVICE — SUT CTD VICRYL 1 VIL BR CTX

## (undated) DEVICE — SET HOTLINE 3 FLUID/BLD WARM

## (undated) DEVICE — COVER PROXIMA MAYO STAND

## (undated) DEVICE — SOL NACL IRR 1000ML BTL

## (undated) DEVICE — CAP BABY BEANIE

## (undated) DEVICE — SKIN STAPLER PMR35

## (undated) DEVICE — GLOVE SENSICARE PI GRN 7.5

## (undated) DEVICE — TRAY SKIN SCRUB WET PREMIUM

## (undated) DEVICE — BINDER ABDOM 4PANEL 12IN LG/XL

## (undated) DEVICE — SUT VICRYL 2-0 36 CT-1

## (undated) DEVICE — GLOVE PROTEXIS PI SYN SURG 7.5

## (undated) DEVICE — ELECTRODE REM PLYHSV RETURN 9

## (undated) DEVICE — GLOVE SIGNATURE ESSNTL LTX 7.5